# Patient Record
Sex: FEMALE | Race: WHITE | Employment: OTHER
[De-identification: names, ages, dates, MRNs, and addresses within clinical notes are randomized per-mention and may not be internally consistent; named-entity substitution may affect disease eponyms.]

---

## 2017-06-09 ENCOUNTER — HOSPITAL ENCOUNTER (OUTPATIENT)
Dept: MAMMOGRAPHY | Facility: CLINIC | Age: 62
Discharge: HOME | End: 2017-06-09
Payer: COMMERCIAL

## 2017-06-09 DIAGNOSIS — M81.0 OSTEOPOROSIS, UNSPECIFIED: ICD-10-CM

## 2017-06-09 PROCEDURE — 77080 DXA BONE DENSITY AXIAL: CPT

## 2022-02-27 ENCOUNTER — APPOINTMENT (OUTPATIENT)
Dept: GENERAL RADIOLOGY | Age: 67
End: 2022-02-27
Payer: COMMERCIAL

## 2022-02-27 ENCOUNTER — HOSPITAL ENCOUNTER (EMERGENCY)
Age: 67
Discharge: HOME | End: 2022-02-27
Attending: EMERGENCY MEDICINE
Payer: COMMERCIAL

## 2022-02-27 VITALS
DIASTOLIC BLOOD PRESSURE: 80 MMHG | HEIGHT: 67 IN | BODY MASS INDEX: 20.4 KG/M2 | TEMPERATURE: 98.4 F | RESPIRATION RATE: 20 BRPM | HEART RATE: 89 BPM | OXYGEN SATURATION: 96 % | WEIGHT: 130 LBS | SYSTOLIC BLOOD PRESSURE: 150 MMHG

## 2022-02-27 DIAGNOSIS — M79.671 RIGHT FOOT PAIN: Primary | ICD-10-CM

## 2022-02-27 LAB — GLUCOSE BLD-MCNC: 46 MG/DL (ref 65–105)

## 2022-02-27 PROCEDURE — 73630 X-RAY EXAM OF FOOT: CPT

## 2022-02-27 PROCEDURE — 99285 EMERGENCY DEPT VISIT HI MDM: CPT

## 2022-02-27 PROCEDURE — 73610 X-RAY EXAM OF ANKLE: CPT

## 2022-02-27 PROCEDURE — 82947 ASSAY GLUCOSE BLOOD QUANT: CPT

## 2022-02-27 RX ORDER — FOLIC ACID 1 MG/1
1 TABLET ORAL DAILY
COMMUNITY

## 2022-02-27 RX ORDER — OMEPRAZOLE 20 MG/1
CAPSULE, DELAYED RELEASE ORAL
COMMUNITY

## 2022-02-27 RX ORDER — NAPROXEN 500 MG/1
500 TABLET ORAL 2 TIMES DAILY WITH MEALS
Qty: 10 TABLET | Refills: 0 | Status: SHIPPED | OUTPATIENT
Start: 2022-02-27 | End: 2022-03-04

## 2022-02-27 RX ORDER — CHOLECALCIFEROL (VITAMIN D3) 125 MCG
CAPSULE ORAL
COMMUNITY
Start: 2021-12-17

## 2022-02-27 RX ORDER — INSULIN GLARGINE 100 [IU]/ML
INJECTION, SOLUTION SUBCUTANEOUS
COMMUNITY

## 2022-02-27 RX ORDER — AMOXICILLIN AND CLAVULANATE POTASSIUM 250; 125 MG/1; MG/1
TABLET, FILM COATED ORAL
COMMUNITY
Start: 2022-02-22

## 2022-02-27 RX ORDER — LISINOPRIL 2.5 MG/1
TABLET ORAL
COMMUNITY
Start: 2022-01-18

## 2022-02-27 RX ORDER — TRAMADOL HYDROCHLORIDE 50 MG/1
TABLET ORAL
COMMUNITY
Start: 2022-02-11

## 2022-02-27 RX ORDER — PREGABALIN 100 MG/1
CAPSULE ORAL
COMMUNITY

## 2022-02-27 RX ORDER — ATORVASTATIN CALCIUM 40 MG/1
TABLET, FILM COATED ORAL
COMMUNITY

## 2022-02-27 RX ORDER — INSULIN LISPRO 100 [IU]/ML
INJECTION, SOLUTION INTRAVENOUS; SUBCUTANEOUS
COMMUNITY

## 2022-02-27 RX ORDER — IBUPROFEN 200 MG
400 CAPSULE ORAL 3 TIMES DAILY
COMMUNITY
Start: 2021-12-27

## 2022-02-27 RX ORDER — HYDROXYZINE HYDROCHLORIDE 25 MG/1
25 TABLET, FILM COATED ORAL 3 TIMES DAILY PRN
COMMUNITY

## 2022-02-27 ASSESSMENT — ENCOUNTER SYMPTOMS: COLOR CHANGE: 0

## 2022-02-27 ASSESSMENT — PAIN - FUNCTIONAL ASSESSMENT: PAIN_FUNCTIONAL_ASSESSMENT: 0-10

## 2022-02-27 ASSESSMENT — PAIN SCALES - GENERAL: PAINLEVEL_OUTOF10: 7

## 2022-02-27 NOTE — ED PROVIDER NOTES
[facility]  EMERGENCY DEPARTMENT ENCOUNTER      Pt Name: Ronald Wheeler  MRN: [id number]  [place] 1955  Date of evaluation: 2/27/2022  Provider: Penny Dey       Chief Complaint   Patient presents with    Foot Injury         HISTORY OFPRESENT ILLNESS  (Location/Symptom, Timing/Onset, Context/Setting, Quality, Duration, Modifying Factors, Severity.)   Ronald Wheeler is a 77 y.o. female who presents to the emergency department by private auto for evaluation of right foot and ankle pain when she woke up this morning. Denies recent injury. Patient states she had a previous fracture in her right foot. Does see a podiatrist.  Has a walking boot. On tramadol at home for pain. Pain is a 6 out of 10 describes a throbbing sensation. Aggravated when she walks. Nursing Notes were reviewed. PASTMEDICAL HISTORY   No past medical history on file. SURGICAL HISTORY     No past surgical history on file.       CURRENT MEDICATIONS     Previous Medications    AMOXICILLIN-CLAVULANATE (AUGMENTIN) 250-125 MG PER TABLET    take 1 tablet by mouth once daily    ATORVASTATIN (LIPITOR) 40 MG TABLET    atorvastatin 40 mg tablet    CALCIUM CITRATE (CALCITRATE) 950 (200 CA) MG TABLET    Take 400 mg by mouth 3 times daily    CHOLECALCIFEROL (VITAMIN D3) 50 MCG (2000 UT) TABS    take 2 tablets by mouth once daily    FOLIC ACID (FOLVITE) 1 MG TABLET    Take 1 mg by mouth daily    HYDROXYZINE (ATARAX) 25 MG TABLET    Take 25 mg by mouth 3 times daily as needed    INSULIN GLARGINE (LANTUS SOLOSTAR) 100 UNIT/ML INJECTION PEN    Lantus Solostar U-100 Insulin 100 unit/mL (3 mL) subcutaneous pen    INSULIN LISPRO, 1 UNIT DIAL, (HUMALOG KWIKPEN) 100 UNIT/ML SOPN    Humalog KwikPen (U-100) Insulin 100 unit/mL subcutaneous    LISINOPRIL (PRINIVIL;ZESTRIL) 2.5 MG TABLET    take 1 tablet by mouth once daily    OMEPRAZOLE (PRILOSEC) 20 MG DELAYED RELEASE CAPSULE    omeprazole 20 mg capsule,delayed release    PREGABALIN (LYRICA) 100 MG CAPSULE    pregabalin 100 mg capsule    TRAMADOL (ULTRAM) 50 MG TABLET    take 1 tablet by mouth every 8 hours if needed    VERAPAMIL (CALAN SR) 120 MG EXTENDED RELEASE TABLET    verapamil ER (SR) 120 mg tablet,extended release       ALLERGIES     Penicillins    FAMILY HISTORY     No family history on file. SOCIAL HISTORY       Social History     Socioeconomic History    Marital status:      Spouse name: Not on file    Number of children: Not on file    Years of education: Not on file    Highest education level: Not on file   Occupational History    Not on file   Tobacco Use    Smoking status: Not on file    Smokeless tobacco: Not on file   Substance and Sexual Activity    Alcohol use: Not on file    Drug use: Not on file    Sexual activity: Not on file   Other Topics Concern    Not on file   Social History Narrative    Not on file     Social Determinants of Health     Financial Resource Strain:     Difficulty of Paying Living Expenses: Not on file   Food Insecurity:     Worried About Running Out of Food in the Last Year: Not on file    Scotty of Food in the Last Year: Not on file   Transportation Needs:     Lack of Transportation (Medical): Not on file    Lack of Transportation (Non-Medical):  Not on file   Physical Activity:     Days of Exercise per Week: Not on file    Minutes of Exercise per Session: Not on file   Stress:     Feeling of Stress : Not on file   Social Connections:     Frequency of Communication with Friends and Family: Not on file    Frequency of Social Gatherings with Friends and Family: Not on file    Attends Mandaen Services: Not on file    Active Member of Clubs or Organizations: Not on file    Attends Club or Organization Meetings: Not on file    Marital Status: Not on file   Intimate Partner Violence:     Fear of Current or Ex-Partner: Not on file    Emotionally Abused: Not on file    Physically Abused: Not on file    Sexually Abused: Not on file   Housing Stability:     Unable to Pay for Housing in the Last Year: Not on file    Number of Places Lived in the Last Year: Not on file    Unstable Housing in the Last Year: Not on file         REVIEW OF SYSTEMS    (2-9 systems for level 4, 10 or more for level 5)     Review of Systems   Musculoskeletal: Positive for arthralgias, gait problem and joint swelling. Skin: Negative for color change and wound. All other systems reviewed and are negative. Except as noted above the remainder of the review of systems was reviewed and negative. PHYSICAL EXAM    (up to 7 for level 4, 8 or more for level 5)     ED Triage Vitals [02/27/22 1806]   BP Temp Temp src Pulse Resp SpO2 Height Weight   (!) 150/80 98.4 °F (36.9 °C) -- 89 20 96 % 5' 6.5\" (1.689 m) 130 lb (59 kg)       Physical Exam  Constitutional:       Appearance: Normal appearance. She is well-developed, well-groomed and normal weight. HENT:      Head: Normocephalic. Right Ear: External ear normal.      Left Ear: External ear normal.      Nose: Nose normal.      Mouth/Throat:      Mouth: Mucous membranes are moist.   Eyes:      Extraocular Movements: Extraocular movements intact. Conjunctiva/sclera: Conjunctivae normal.      Pupils: Pupils are equal, round, and reactive to light. Cardiovascular:      Pulses:           Dorsalis pedis pulses are 2+ on the right side. Posterior tibial pulses are 2+ on the right side. Pulmonary:      Effort: Pulmonary effort is normal. No respiratory distress. Musculoskeletal:         General: Normal range of motion. Right ankle: Swelling present. No deformity or ecchymosis. Tenderness present over the lateral malleolus. Normal range of motion. Right foot: Normal range of motion and normal capillary refill. Swelling and bony tenderness present. No crepitus. Normal pulse.         Feet:       Comments: Patient is some tenderness to the dorsum of the foot over the first second toes as well as the medial malleolus. Mild swelling present. No erythema or ecchymosis. Pedal pulses palpable. Can wiggle her toes. Normal Flexion of the right ankle. Skin:     General: Skin is warm and dry. Capillary Refill: Capillary refill takes less than 2 seconds. Findings: No bruising or erythema. Neurological:      Mental Status: She is alert and oriented to person, place, and time. Psychiatric:         Mood and Affect: Mood normal.         Behavior: Behavior normal.         Thought Content: Thought content normal.         Judgment: Judgment normal.           DIAGNOSTIC RESULTS     EKG:All EKG's are interpreted by the Emergency Department Physician who either signs or Co-signs this chart in the absence of a cardiologist.        RADIOLOGY:   Non-plain film images such as CT, Ultrasound and MRI are read by theradiologist. Plain radiographic images are visualized and preliminarily interpreted by the emergency physician with the below findings:    XR ANKLE RIGHT (MIN 3 VIEWS)    Result Date: 2/27/2022  EXAMINATION: THREE XRAY VIEWS OF THE RIGHT ANKLE; THREE XRAY VIEWS OF THE RIGHT FOOT 2/27/2022 4:06 pm COMPARISON: None. HISTORY: ORDERING SYSTEM PROVIDED HISTORY: pain TECHNOLOGIST PROVIDED HISTORY: pain Reason for Exam: Rt ankle pain d/t preexisting Rt foot fx; ORDERING SYSTEM PROVIDED HISTORY: increased pain d/t preexisting fx. TECHNOLOGIST PROVIDED HISTORY: increased pain d/t preexisting fx. Reason for Exam: Rt foot increased pain d/t preexisting fx FINDINGS: The visualized bones are normal.  There is no evidence of fracture or dislocation. Severe degenerative changes of the 1st metatarsophalangeal joint and also degenerative changes involving the navicular 1st cuneiform joint. . The remaining joint spaces appear well maintained. Soft tissue swelling.      No acute bony abnormalities are noted     XR FOOT RIGHT (MIN 3 VIEWS)    Result Date: 2/27/2022  EXAMINATION: THREE XRAY VIEWS OF THE RIGHT ANKLE; THREE XRAY VIEWS OF THE RIGHT FOOT 2/27/2022 4:06 pm COMPARISON: None. HISTORY: ORDERING SYSTEM PROVIDED HISTORY: pain TECHNOLOGIST PROVIDED HISTORY: pain Reason for Exam: Rt ankle pain d/t preexisting Rt foot fx; ORDERING SYSTEM PROVIDED HISTORY: increased pain d/t preexisting fx. TECHNOLOGIST PROVIDED HISTORY: increased pain d/t preexisting fx. Reason for Exam: Rt foot increased pain d/t preexisting fx FINDINGS: The visualized bones are normal.  There is no evidence of fracture or dislocation. Severe degenerative changes of the 1st metatarsophalangeal joint and also degenerative changes involving the navicular 1st cuneiform joint. . The remaining joint spaces appear well maintained. Soft tissue swelling. No acute bony abnormalities are noted     Interpretation per the Radiologist below, if available at the time of this note:    XR ANKLE RIGHT (MIN 3 VIEWS)   Final Result   No acute bony abnormalities are noted         XR FOOT RIGHT (MIN 3 VIEWS)   Final Result   No acute bony abnormalities are noted               EDBEDSIDE ULTRASOUND:   Performed by Kingston Starr - none    LABS:  Labs Reviewed - No data to display    All other labs were within normal range or not returned as of this dictation. EMERGENCY DEPARTMENT COURSE andDIFFERENTIAL DIAGNOSIS/MDM:   X-rays of right ankle and right foot per radiologist shows no acute bony abnormalities. Degenerative changes of the first metatarsophalangeal joint. I discussed x-ray results with the patient. She has an orthopedic boot upon arrival but wants something different. Ace wrap and Aircast placed her ankle by the nurse. Prescription written for naproxen. She is already on tramadol at home for pain. Patient states she has a podiatrist and will follow up with her doctor.     Vitals:    Vitals:    02/27/22 1806   BP: (!) 150/80   Pulse: 89   Resp: 20   Temp: 98.4 °F (36.9 °C)   SpO2: 96%   Weight: 130 lb (59 kg)   Height: 5' 6.5\" (1.689 m)         CONSULTS:  None    RES:  Procedures    FINAL IMPRESSION      1.  Right foot pain          DISPOSITION/PLAN   DISPOSITION Decision To Discharge 02/27/2022 07:35:15 PM      PATIENT REFERRED TO:     Follow-up with your podiatrist.  Schedule an appointment as soon as possible for a visit       [id number] [place] [address] [address] [facility], [address] [place] [address]  [phone]      As needed    [facility]  [address] [facility]  [phone]    As needed      DISCHARGE MEDICATIONS:     New Prescriptions    NAPROXEN (NAPROSYN) 500 MG TABLET    Take 1 tablet by mouth 2 times daily (with meals) for 5 days     Electronically signed by KASHMIR Mancuso 2/27/2022 at 7:37 PM           KASHMIR Mancuso CNP  02/27/22 1938

## 2022-02-28 NOTE — ED NOTES
POCT Glucose checked per pt's request; pt had insulin PTA but did not eat dinner. Pt was provided with OJ & boxed lunch along with pt's cab voucher. Pt verbalized understanding. Pt was wheeled to the lobby.       Esther Queen RN  02/27/22 2000

## 2022-03-03 NOTE — ED PROVIDER NOTES
eMERGENCY dEPARTMENT eNCOUnter   Independent Attestation     Pt Name: Rebeca Ramirez  MRN: [id number]  [place] 1955  Date of evaluation: 3/3/22     Rebeca Ramirez is a 77 y.o. female with CC: Foot Injury        This visit was performed by both a physician and an APC. I performed all aspects of the MDM as documented. The care is provided during an unprecedented national emergency due to the novel coronavirus, COVID 19.     Adriane Clemens MD  Attending Emergency Physician          Brayan Freeman MD  03/03/22 1168

## 2023-04-28 ENCOUNTER — APPOINTMENT (OUTPATIENT)
Dept: GENERAL RADIOLOGY | Age: 68
End: 2023-04-28
Payer: COMMERCIAL

## 2023-04-28 ENCOUNTER — HOSPITAL ENCOUNTER (EMERGENCY)
Age: 68
Discharge: HOME | End: 2023-04-28
Attending: EMERGENCY MEDICINE
Payer: COMMERCIAL

## 2023-04-28 VITALS
TEMPERATURE: 98.6 F | SYSTOLIC BLOOD PRESSURE: 131 MMHG | RESPIRATION RATE: 16 BRPM | HEIGHT: 66 IN | BODY MASS INDEX: 20.89 KG/M2 | HEART RATE: 91 BPM | OXYGEN SATURATION: 96 % | WEIGHT: 130 LBS | DIASTOLIC BLOOD PRESSURE: 72 MMHG

## 2023-04-28 DIAGNOSIS — U07.1 COVID-19: Primary | ICD-10-CM

## 2023-04-28 LAB
CHP ED QC CHECK: YES
GLUCOSE BLD-MCNC: 246 MG/DL
GLUCOSE BLD-MCNC: 246 MG/DL (ref 65–105)

## 2023-04-28 PROCEDURE — 82947 ASSAY GLUCOSE BLOOD QUANT: CPT

## 2023-04-28 PROCEDURE — 71045 X-RAY EXAM CHEST 1 VIEW: CPT

## 2023-04-28 PROCEDURE — 99283 EMERGENCY DEPT VISIT LOW MDM: CPT

## 2023-04-28 RX ORDER — METHYLPREDNISOLONE 4 MG/1
TABLET ORAL
Qty: 1 KIT | Refills: 0 | Status: SHIPPED | OUTPATIENT
Start: 2023-04-28 | End: 2023-05-04

## 2023-04-28 ASSESSMENT — PAIN - FUNCTIONAL ASSESSMENT: PAIN_FUNCTIONAL_ASSESSMENT: NONE - DENIES PAIN

## 2023-04-28 NOTE — ED NOTES
Patient presents to ED for a positive Covid test at home this morning. Patient reports had a positive Covid test at home this morning by a [employer] [facility] staff member at [facility] where she lives. Patient only symptoms/complaint is loss of taste and smell. Patient is in no acute distress, respirations at regular and unlabored, SpO2 WNL, no adventitious breath sounds noted.       Ernie Rodriguez., [place]  01/38/50 3753

## 2023-04-29 NOTE — ED PROVIDER NOTES
EMERGENCY DEPARTMENT ENCOUNTER    Pt Name: Cheri Gibson  MRN: [id number]  [place] 1955  Date of evaluation: 4/28/23  CHIEF COMPLAINT       Chief Complaint   Patient presents with    Positive For Covid-19     Patient reports had a positive Covid test at home this morning by a [employer] [facility] staff member at [facility] where she lives. HISTORY OF PRESENT ILLNESS   71-year-old female presents the emergency room after testing positive for COVID-19 earlier today. Patient has had some nasal congestion and head cold symptoms. She has a slight cough. No chest discomfort or difficulty breathing. Patient does not have a fever here in the emergency room today. She reports overall she does not feel poor. REVIEW OF SYSTEMS     Review of Systems   HENT:  Positive for congestion. Respiratory:  Negative for chest tightness and shortness of breath. Neurological:  Positive for headaches. PASTMEDICAL HISTORY   No past medical history on file. Past Problem List  There is no problem list on file for this patient. SURGICAL HISTORY     No past surgical history on file.   CURRENT MEDICATIONS       Discharge Medication List as of 4/28/2023  9:16 PM        CONTINUE these medications which have NOT CHANGED    Details   amoxicillin-clavulanate (AUGMENTIN) 250-125 MG per tablet take 1 tablet by mouth once dailyHistorical Med      atorvastatin (LIPITOR) 40 MG tablet atorvastatin 40 mg tabletHistorical Med      calcium citrate (CALCITRATE) 950 (200 Ca) MG tablet Take 400 mg by mouth 3 times dailyHistorical Med      Cholecalciferol (VITAMIN D3) 50 MCG (2000 UT) TABS take 2 tablets by mouth once dailyHistorical Med      folic acid (FOLVITE) 1 MG tablet Take 1 mg by mouth dailyHistorical Med      hydrOXYzine (ATARAX) 25 MG tablet Take 25 mg by mouth 3 times daily as neededHistorical Med      insulin glargine (LANTUS SOLOSTAR) 100 UNIT/ML injection pen Lantus Solostar U-100 Insulin 100 unit/mL

## 2023-04-29 NOTE — DISCHARGE INSTRUCTIONS
Start the Paxlovid when possible. Steroids can be used as well to help with respiratory symptoms. I recommend return to the ER especially if symptoms worsen.

## 2023-04-30 ASSESSMENT — ENCOUNTER SYMPTOMS
SHORTNESS OF BREATH: 0
CHEST TIGHTNESS: 0

## 2024-02-05 ENCOUNTER — HOSPITAL ENCOUNTER (EMERGENCY)
Age: 69
Discharge: HOME | End: 2024-02-05
Attending: EMERGENCY MEDICINE
Payer: COMMERCIAL

## 2024-02-05 ENCOUNTER — APPOINTMENT (OUTPATIENT)
Dept: VASCULAR LAB | Age: 69
End: 2024-02-05
Attending: EMERGENCY MEDICINE
Payer: COMMERCIAL

## 2024-02-05 VITALS
BODY MASS INDEX: 21.86 KG/M2 | SYSTOLIC BLOOD PRESSURE: 136 MMHG | HEIGHT: 66 IN | RESPIRATION RATE: 16 BRPM | HEART RATE: 72 BPM | WEIGHT: 136 LBS | DIASTOLIC BLOOD PRESSURE: 58 MMHG | OXYGEN SATURATION: 95 % | TEMPERATURE: 98.6 F

## 2024-02-05 DIAGNOSIS — I82.4Z2 ACUTE DEEP VEIN THROMBOSIS (DVT) OF DISTAL VEIN OF LEFT LOWER EXTREMITY (HCC): ICD-10-CM

## 2024-02-05 DIAGNOSIS — M79.605 ACUTE PAIN OF LEFT LOWER EXTREMITY: Primary | ICD-10-CM

## 2024-02-05 LAB — ECHO BSA: 1.69 M2

## 2024-02-05 PROCEDURE — 93971 EXTREMITY STUDY: CPT

## 2024-02-05 PROCEDURE — 99284 EMERGENCY DEPT VISIT MOD MDM: CPT

## 2024-02-05 PROCEDURE — 6370000000 HC RX 637 (ALT 250 FOR IP): Performed by: EMERGENCY MEDICINE

## 2024-02-05 RX ORDER — OXYCODONE HYDROCHLORIDE AND ACETAMINOPHEN 5; 325 MG/1; MG/1
1 TABLET ORAL ONCE
Status: COMPLETED | OUTPATIENT
Start: 2024-02-05 | End: 2024-02-05

## 2024-02-05 RX ORDER — OXYCODONE HYDROCHLORIDE AND ACETAMINOPHEN 5; 325 MG/1; MG/1
1 TABLET ORAL EVERY 8 HOURS PRN
Qty: 14 TABLET | Refills: 0 | Status: SHIPPED | OUTPATIENT
Start: 2024-02-05 | End: 2024-02-12

## 2024-02-05 RX ADMIN — OXYCODONE HYDROCHLORIDE AND ACETAMINOPHEN 1 TABLET: 5; 325 TABLET ORAL at 08:25

## 2024-02-05 ASSESSMENT — PAIN DESCRIPTION - ORIENTATION
ORIENTATION: LEFT;LOWER
ORIENTATION: LEFT;LOWER

## 2024-02-05 ASSESSMENT — PAIN SCALES - GENERAL
PAINLEVEL_OUTOF10: 5
PAINLEVEL_OUTOF10: 7

## 2024-02-05 ASSESSMENT — PAIN DESCRIPTION - LOCATION
LOCATION: LEG
LOCATION: LEG

## 2024-02-05 ASSESSMENT — PAIN DESCRIPTION - DESCRIPTORS: DESCRIPTORS: SHARP

## 2024-02-05 ASSESSMENT — PAIN - FUNCTIONAL ASSESSMENT: PAIN_FUNCTIONAL_ASSESSMENT: 0-10

## 2024-02-05 ASSESSMENT — PAIN DESCRIPTION - FREQUENCY: FREQUENCY: INTERMITTENT

## 2024-02-05 NOTE — DISCHARGE INSTRUCTIONS
Continue with the Eliquis as prescribed previously.  Given increase in pain I would recommend Percocet.  It is not recommended to take Percocet and tramadol together.  These medications are both narcotics.    Make sure to follow-up with vascular surgery as previously instructed.

## 2024-02-05 NOTE — ED PROVIDER NOTES
EMERGENCY DEPARTMENT ENCOUNTER    Pt Name: Camille Garduno  MRN: [id number]  Birthdate 1955  Date of evaluation: 2/5/24  CHIEF COMPLAINT       Chief Complaint   Patient presents with    Leg Pain     L lower leg pain     HISTORY OF PRESENT ILLNESS   68 old female presents emergency room for increased pain to her left lower leg.  Patient had been diagnosed with a DVT on Friday.  She was started on Eliquis.  She reports increased pain and feeling like the clot is traveling.  She is here demanding a second ultrasound although it has only been 3 days since the ultrasound done on Friday.  She is not having any shortness of breath or chest pain.  She is very concerned about the \" clot traveling.\"             REVIEW OF SYSTEMS     Review of Systems   Cardiovascular:  Positive for leg swelling.     PASTMEDICAL HISTORY   No past medical history on file.  Past Problem List  There is no problem list on file for this patient.    SURGICAL HISTORY     No past surgical history on file.  CURRENT MEDICATIONS       Previous Medications    AMOXICILLIN-CLAVULANATE (AUGMENTIN) 250-125 MG PER TABLET    take 1 tablet by mouth once daily    ATORVASTATIN (LIPITOR) 40 MG TABLET    atorvastatin 40 mg tablet    CALCIUM CITRATE (CALCITRATE) 950 (200 CA) MG TABLET    Take 400 mg by mouth 3 times daily    CHOLECALCIFEROL (VITAMIN D3) 50 MCG (2000 UT) TABS    take 2 tablets by mouth once daily    FOLIC ACID (FOLVITE) 1 MG TABLET    Take 1 mg by mouth daily    HYDROXYZINE (ATARAX) 25 MG TABLET    Take 25 mg by mouth 3 times daily as needed    INSULIN GLARGINE (LANTUS SOLOSTAR) 100 UNIT/ML INJECTION PEN    Lantus Solostar U-100 Insulin 100 unit/mL (3 mL) subcutaneous pen    INSULIN LISPRO, 1 UNIT DIAL, (HUMALOG KWIKPEN) 100 UNIT/ML SOPN    Humalog KwikPen (U-100) Insulin 100 unit/mL subcutaneous    LISINOPRIL (PRINIVIL;ZESTRIL) 2.5 MG TABLET    take 1 tablet by mouth once daily    NAPROXEN (NAPROSYN) 500 MG TABLET    Take 1 tablet by mouth 2 times

## 2024-02-05 NOTE — ED TRIAGE NOTES
Pt to ED via [facility], stretcher to rm 23, bed per self. Pt c/o L lower leg pain since Fri. Seen Fri, given dx of dvt, partially compliant with Elaquis. States intermittent pain and traveling location of pain. Small bruise noted. Pt denies falls, trauma to leg, sob or cp. +rom to L leg. Patent airway, no dyspnea or cyanosis noted. Bed to lowest position, side rails up x2, call light within reach.

## 2024-08-15 ENCOUNTER — HOSPITAL ENCOUNTER (EMERGENCY)
Age: 69
Discharge: HOME | End: 2024-08-15
Attending: EMERGENCY MEDICINE
Payer: COMMERCIAL

## 2024-08-15 VITALS
DIASTOLIC BLOOD PRESSURE: 68 MMHG | RESPIRATION RATE: 16 BRPM | OXYGEN SATURATION: 98 % | TEMPERATURE: 97.8 F | HEART RATE: 78 BPM | BODY MASS INDEX: 21.95 KG/M2 | WEIGHT: 136 LBS | SYSTOLIC BLOOD PRESSURE: 158 MMHG

## 2024-08-15 DIAGNOSIS — R11.2 NAUSEA AND VOMITING, UNSPECIFIED VOMITING TYPE: Primary | ICD-10-CM

## 2024-08-15 LAB
ALBUMIN SERPL-MCNC: 3.5 G/DL (ref 3.5–5.2)
ALP SERPL-CCNC: 63 U/L (ref 35–104)
ALT SERPL-CCNC: 16 U/L (ref 5–33)
ANION GAP SERPL CALCULATED.3IONS-SCNC: 10 MMOL/L (ref 9–17)
AST SERPL-CCNC: 15 U/L
BASOPHILS # BLD: 0.06 K/UL (ref 0–0.2)
BASOPHILS NFR BLD: 1 %
BILIRUB DIRECT SERPL-MCNC: 0.2 MG/DL
BILIRUB INDIRECT SERPL-MCNC: 0.4 MG/DL (ref 0–1)
BILIRUB SERPL-MCNC: 0.6 MG/DL (ref 0.3–1.2)
BUN SERPL-MCNC: 17 MG/DL (ref 8–23)
BUN/CREAT SERPL: 34 (ref 9–20)
CALCIUM SERPL-MCNC: 8.7 MG/DL (ref 8.6–10.4)
CHLORIDE SERPL-SCNC: 104 MMOL/L (ref 98–107)
CO2 SERPL-SCNC: 22 MMOL/L (ref 20–31)
CREAT SERPL-MCNC: 0.5 MG/DL (ref 0.5–0.9)
EOSINOPHIL # BLD: 0.06 K/UL (ref 0–0.4)
EOSINOPHILS RELATIVE PERCENT: 1 % (ref 1–4)
ERYTHROCYTE [DISTWIDTH] IN BLOOD BY AUTOMATED COUNT: 13.5 % (ref 11.8–14.4)
FLUAV RNA RESP QL NAA+PROBE: NOT DETECTED
FLUBV RNA RESP QL NAA+PROBE: NOT DETECTED
GFR, ESTIMATED: >90 ML/MIN/1.73M2
GLUCOSE SERPL-MCNC: 222 MG/DL (ref 70–99)
HCT VFR BLD AUTO: 37.7 % (ref 36.3–47.1)
HGB BLD-MCNC: 12.5 G/DL (ref 11.9–15.1)
IMM GRANULOCYTES # BLD AUTO: 0 K/UL (ref 0–0.3)
IMM GRANULOCYTES NFR BLD: 0 %
INR PPP: 1
LIPASE SERPL-CCNC: 7 U/L (ref 13–60)
LYMPHOCYTES NFR BLD: 0.61 K/UL (ref 1–4.8)
LYMPHOCYTES RELATIVE PERCENT: 11 % (ref 24–44)
MCH RBC QN AUTO: 30.1 PG (ref 25.2–33.5)
MCHC RBC AUTO-ENTMCNC: 33.2 G/DL (ref 28.4–34.8)
MCV RBC AUTO: 90.8 FL (ref 82.6–102.9)
MONOCYTES NFR BLD: 0.39 K/UL (ref 0.2–0.8)
MONOCYTES NFR BLD: 7 % (ref 1–7)
NEUTROPHILS NFR BLD: 80 % (ref 36–66)
NEUTS SEG NFR BLD: 4.38 K/UL (ref 1.8–7.7)
NRBC BLD-RTO: 0 PER 100 WBC
PLATELET # BLD AUTO: 185 K/UL (ref 138–453)
PMV BLD AUTO: 11.2 FL (ref 8.1–13.5)
POTASSIUM SERPL-SCNC: 4.3 MMOL/L (ref 3.7–5.3)
PROT SERPL-MCNC: 6 G/DL (ref 6.4–8.3)
PROTHROMBIN TIME: 13.6 SEC (ref 11.5–14.2)
RBC # BLD AUTO: 4.15 M/UL (ref 3.95–5.11)
SARS-COV-2 RNA RESP QL NAA+PROBE: NOT DETECTED
SODIUM SERPL-SCNC: 136 MMOL/L (ref 135–144)
SOURCE: NORMAL
SPECIMEN DESCRIPTION: NORMAL
WBC OTHER # BLD: 5.5 K/UL (ref 3.5–11.3)

## 2024-08-15 PROCEDURE — 2580000003 HC RX 258: Performed by: EMERGENCY MEDICINE

## 2024-08-15 PROCEDURE — 99284 EMERGENCY DEPT VISIT MOD MDM: CPT

## 2024-08-15 PROCEDURE — 83690 ASSAY OF LIPASE: CPT

## 2024-08-15 PROCEDURE — 6360000002 HC RX W HCPCS: Performed by: EMERGENCY MEDICINE

## 2024-08-15 PROCEDURE — 85610 PROTHROMBIN TIME: CPT

## 2024-08-15 PROCEDURE — 80048 BASIC METABOLIC PNL TOTAL CA: CPT

## 2024-08-15 PROCEDURE — 85025 COMPLETE CBC W/AUTO DIFF WBC: CPT

## 2024-08-15 PROCEDURE — 80076 HEPATIC FUNCTION PANEL: CPT

## 2024-08-15 PROCEDURE — 87636 SARSCOV2 & INF A&B AMP PRB: CPT

## 2024-08-15 PROCEDURE — 96374 THER/PROPH/DIAG INJ IV PUSH: CPT

## 2024-08-15 RX ORDER — ONDANSETRON 2 MG/ML
4 INJECTION INTRAMUSCULAR; INTRAVENOUS ONCE
Status: COMPLETED | OUTPATIENT
Start: 2024-08-15 | End: 2024-08-15

## 2024-08-15 RX ORDER — 0.9 % SODIUM CHLORIDE 0.9 %
1000 INTRAVENOUS SOLUTION INTRAVENOUS ONCE
Status: COMPLETED | OUTPATIENT
Start: 2024-08-15 | End: 2024-08-15

## 2024-08-15 RX ORDER — ONDANSETRON 4 MG/1
4 TABLET, ORALLY DISINTEGRATING ORAL 3 TIMES DAILY PRN
Qty: 21 TABLET | Refills: 0 | Status: SHIPPED | OUTPATIENT
Start: 2024-08-15

## 2024-08-15 RX ADMIN — SODIUM CHLORIDE 1000 ML: 9 INJECTION, SOLUTION INTRAVENOUS at 13:12

## 2024-08-15 RX ADMIN — ONDANSETRON 4 MG: 2 INJECTION INTRAMUSCULAR; INTRAVENOUS at 13:13

## 2024-08-15 ASSESSMENT — ENCOUNTER SYMPTOMS
NAUSEA: 1
COLOR CHANGE: 0
SHORTNESS OF BREATH: 0
TROUBLE SWALLOWING: 0
COUGH: 1
FACIAL SWELLING: 0
BACK PAIN: 0
VOICE CHANGE: 0
PHOTOPHOBIA: 0
VOMITING: 1
CHEST TIGHTNESS: 0
EYE PAIN: 0
ABDOMINAL PAIN: 0

## 2024-08-15 ASSESSMENT — PAIN - FUNCTIONAL ASSESSMENT: PAIN_FUNCTIONAL_ASSESSMENT: NONE - DENIES PAIN

## 2024-08-15 NOTE — ED PROVIDER NOTES
EMERGENCY DEPARTMENT ENCOUNTER    Pt Name: Camille Garduno  MRN: [id number]  Birthdate 1955  Date of evaluation: 8/15/24  CHIEF COMPLAINT       Chief Complaint   Patient presents with    Emesis     Started yesterday where she is living is on lock down due to covid     HISTORY OF PRESENT ILLNESS   68-year-old female presenting to the ER complaining of nausea vomiting and a cough since yesterday.  Patient states her facility is on lockdown because of a COVID outbreak.  Patient was concerned because of the amount of vomiting.    The history is provided by the patient.   Nausea & Vomiting  Severity:  Moderate  Duration:  1 day  Timing:  Constant  Associated symptoms: cough    Associated symptoms: no abdominal pain, no arthralgias and no headaches            REVIEW OF SYSTEMS     Review of Systems   Constitutional:  Negative for activity change, appetite change and fatigue.   HENT:  Negative for facial swelling, trouble swallowing and voice change.    Eyes:  Negative for photophobia and pain.   Respiratory:  Positive for cough. Negative for chest tightness and shortness of breath.    Cardiovascular:  Negative for chest pain and palpitations.   Gastrointestinal:  Positive for nausea and vomiting. Negative for abdominal pain.   Genitourinary:  Negative for dysuria and urgency.   Musculoskeletal:  Negative for arthralgias and back pain.   Skin:  Negative for color change and rash.   Neurological:  Negative for dizziness, syncope and headaches.   Psychiatric/Behavioral:  Negative for behavioral problems and hallucinations.      PASTMEDICAL HISTORY   History reviewed. No pertinent past medical history.  Past Problem List  There is no problem list on file for this patient.    SURGICAL HISTORY     History reviewed. No pertinent surgical history.  CURRENT MEDICATIONS       Previous Medications    AMOXICILLIN-CLAVULANATE (AUGMENTIN) 250-125 MG PER TABLET    take 1 tablet by mouth once daily    ATORVASTATIN (LIPITOR) 40 MG

## 2024-08-15 NOTE — ED NOTES
Px states that she would like to be put in observation due to inability to  meds. Px educated/assisted to our pharmacy so she is able to  her medications

## 2024-12-15 ENCOUNTER — HOSPITAL ENCOUNTER (INPATIENT)
Age: 69
LOS: 2 days | Discharge: HOME HEALTH | DRG: 638 | End: 2024-12-17
Attending: EMERGENCY MEDICINE | Admitting: FAMILY MEDICINE
Payer: COMMERCIAL

## 2024-12-15 DIAGNOSIS — R73.9 HYPERGLYCEMIA: ICD-10-CM

## 2024-12-15 DIAGNOSIS — N17.9 ACUTE KIDNEY INJURY (HCC): Primary | ICD-10-CM

## 2024-12-15 PROBLEM — N39.41 URGE INCONTINENCE: Status: ACTIVE | Noted: 2023-03-29

## 2024-12-15 PROBLEM — M15.0 PRIMARY GENERALIZED (OSTEO)ARTHRITIS: Status: ACTIVE | Noted: 2021-02-24

## 2024-12-15 PROBLEM — R27.8 SENSORY ATAXIA: Status: ACTIVE | Noted: 2024-12-15

## 2024-12-15 PROBLEM — R33.9 RETENTION OF URINE: Status: ACTIVE | Noted: 2021-03-04

## 2024-12-15 PROBLEM — F42.9 OBSESSIVE-COMPULSIVE DISORDER: Status: ACTIVE | Noted: 2024-12-03

## 2024-12-15 PROBLEM — G62.9 NEUROPATHY: Status: ACTIVE | Noted: 2020-09-28

## 2024-12-15 PROBLEM — M72.2 PLANTAR FASCIITIS OF RIGHT FOOT: Status: ACTIVE | Noted: 2024-03-28

## 2024-12-15 PROBLEM — M81.0 AGE RELATED OSTEOPOROSIS: Status: ACTIVE | Noted: 2024-08-13

## 2024-12-15 PROBLEM — N39.0 RECURRENT UTI: Status: ACTIVE | Noted: 2019-06-04

## 2024-12-15 PROBLEM — E11.42 TYPE 2 DIABETES MELLITUS WITH POLYNEUROPATHY (HCC): Status: ACTIVE | Noted: 2018-11-03

## 2024-12-15 PROBLEM — E11.00 HYPEROSMOLAR HYPERGLYCEMIC STATE (HHS) (HCC): Status: ACTIVE | Noted: 2024-12-15

## 2024-12-15 PROBLEM — E78.2 MIXED HYPERLIPIDEMIA: Status: ACTIVE | Noted: 2020-09-28

## 2024-12-15 LAB
ANION GAP SERPL CALCULATED.3IONS-SCNC: 10 MMOL/L (ref 9–16)
BASOPHILS # BLD: 0.04 K/UL (ref 0–0.2)
BASOPHILS NFR BLD: 1 % (ref 0–2)
BILIRUB UR QL STRIP: NEGATIVE
BUN SERPL-MCNC: 45 MG/DL (ref 8–23)
CALCIUM SERPL-MCNC: 8.7 MG/DL (ref 8.8–10.2)
CHLORIDE SERPL-SCNC: 101 MMOL/L (ref 98–107)
CLARITY UR: ABNORMAL
CO2 SERPL-SCNC: 22 MMOL/L (ref 20–31)
COLOR UR: YELLOW
CREAT SERPL-MCNC: 1.8 MG/DL (ref 0.5–0.9)
EOSINOPHIL # BLD: 0.21 K/UL (ref 0–0.44)
EOSINOPHILS RELATIVE PERCENT: 4 % (ref 1–4)
EPI CELLS #/AREA URNS HPF: ABNORMAL /HPF (ref 0–5)
ERYTHROCYTE [DISTWIDTH] IN BLOOD BY AUTOMATED COUNT: 12.7 % (ref 11.8–14.4)
EST. AVERAGE GLUCOSE BLD GHB EST-MCNC: 220 MG/DL
GFR, ESTIMATED: 30 ML/MIN/1.73M2
GLUCOSE BLD-MCNC: 218 MG/DL (ref 65–105)
GLUCOSE BLD-MCNC: 338 MG/DL (ref 65–105)
GLUCOSE BLD-MCNC: 487 MG/DL (ref 65–105)
GLUCOSE BLD-MCNC: 68 MG/DL (ref 65–105)
GLUCOSE BLD-MCNC: 82 MG/DL (ref 65–105)
GLUCOSE SERPL-MCNC: 482 MG/DL (ref 82–115)
GLUCOSE UR STRIP-MCNC: ABNORMAL MG/DL
HBA1C MFR BLD: 9.3 % (ref 4–6)
HCT VFR BLD AUTO: 35.8 % (ref 36.3–47.1)
HGB BLD-MCNC: 12.2 G/DL (ref 11.9–15.1)
HGB UR QL STRIP.AUTO: ABNORMAL
IMM GRANULOCYTES # BLD AUTO: 0.03 K/UL (ref 0–0.3)
IMM GRANULOCYTES NFR BLD: 1 %
KETONES UR STRIP-MCNC: NEGATIVE MG/DL
LEUKOCYTE ESTERASE UR QL STRIP: ABNORMAL
LYMPHOCYTES NFR BLD: 1.24 K/UL (ref 1.1–3.7)
LYMPHOCYTES RELATIVE PERCENT: 23 % (ref 24–43)
MCH RBC QN AUTO: 30.1 PG (ref 25.2–33.5)
MCHC RBC AUTO-ENTMCNC: 34.1 G/DL (ref 28.4–34.8)
MCV RBC AUTO: 88.4 FL (ref 82.6–102.9)
MONOCYTES NFR BLD: 0.58 K/UL (ref 0.1–1.2)
MONOCYTES NFR BLD: 11 % (ref 3–12)
NEUTROPHILS NFR BLD: 60 % (ref 36–65)
NEUTS SEG NFR BLD: 3.34 K/UL (ref 1.5–8.1)
NITRITE UR QL STRIP: NEGATIVE
NRBC BLD-RTO: 0 PER 100 WBC
PH UR STRIP: 5.5 [PH] (ref 5–8)
PLATELET # BLD AUTO: 230 K/UL (ref 138–453)
PMV BLD AUTO: 10.4 FL (ref 8.1–13.5)
POTASSIUM SERPL-SCNC: 4.5 MMOL/L (ref 3.7–5.3)
PROT UR STRIP-MCNC: ABNORMAL MG/DL
RBC # BLD AUTO: 4.05 M/UL (ref 3.95–5.11)
RBC #/AREA URNS HPF: ABNORMAL /HPF (ref 0–2)
SODIUM SERPL-SCNC: 133 MMOL/L (ref 136–145)
SODIUM UR-SCNC: 42 MMOL/L
SP GR UR STRIP: 1.01 (ref 1–1.03)
TOTAL PROTEIN, URINE: 57 MG/DL
UROBILINOGEN UR STRIP-ACNC: NORMAL EU/DL (ref 0–1)
WBC #/AREA URNS HPF: ABNORMAL /HPF (ref 0–5)
WBC OTHER # BLD: 5.4 K/UL (ref 3.5–11.3)

## 2024-12-15 PROCEDURE — 99285 EMERGENCY DEPT VISIT HI MDM: CPT

## 2024-12-15 PROCEDURE — 85025 COMPLETE CBC W/AUTO DIFF WBC: CPT

## 2024-12-15 PROCEDURE — 83036 HEMOGLOBIN GLYCOSYLATED A1C: CPT

## 2024-12-15 PROCEDURE — 81001 URINALYSIS AUTO W/SCOPE: CPT

## 2024-12-15 PROCEDURE — 84156 ASSAY OF PROTEIN URINE: CPT

## 2024-12-15 PROCEDURE — 2580000003 HC RX 258: Performed by: FAMILY MEDICINE

## 2024-12-15 PROCEDURE — 6370000000 HC RX 637 (ALT 250 FOR IP): Performed by: EMERGENCY MEDICINE

## 2024-12-15 PROCEDURE — 2580000003 HC RX 258: Performed by: EMERGENCY MEDICINE

## 2024-12-15 PROCEDURE — 80048 BASIC METABOLIC PNL TOTAL CA: CPT

## 2024-12-15 PROCEDURE — 99222 1ST HOSP IP/OBS MODERATE 55: CPT | Performed by: FAMILY MEDICINE

## 2024-12-15 PROCEDURE — 82947 ASSAY GLUCOSE BLOOD QUANT: CPT

## 2024-12-15 PROCEDURE — 6370000000 HC RX 637 (ALT 250 FOR IP): Performed by: NURSE PRACTITIONER

## 2024-12-15 PROCEDURE — 6370000000 HC RX 637 (ALT 250 FOR IP): Performed by: FAMILY MEDICINE

## 2024-12-15 PROCEDURE — 1200000000 HC SEMI PRIVATE

## 2024-12-15 PROCEDURE — 51798 US URINE CAPACITY MEASURE: CPT

## 2024-12-15 PROCEDURE — 84300 ASSAY OF URINE SODIUM: CPT

## 2024-12-15 RX ORDER — APIXABAN 5 MG/1
5 TABLET, FILM COATED ORAL 2 TIMES DAILY
COMMUNITY
Start: 2024-11-22

## 2024-12-15 RX ORDER — CALCIUM CARBONATE 500 MG/1
500 TABLET, CHEWABLE ORAL 3 TIMES DAILY
Status: DISCONTINUED | OUTPATIENT
Start: 2024-12-15 | End: 2024-12-17 | Stop reason: HOSPADM

## 2024-12-15 RX ORDER — PREGABALIN 75 MG/1
150 CAPSULE ORAL 3 TIMES DAILY
Status: DISCONTINUED | OUTPATIENT
Start: 2024-12-15 | End: 2024-12-15

## 2024-12-15 RX ORDER — PREGABALIN 100 MG/1
100 CAPSULE ORAL DAILY
Status: DISCONTINUED | OUTPATIENT
Start: 2024-12-15 | End: 2024-12-15

## 2024-12-15 RX ORDER — INSULIN GLARGINE 100 [IU]/ML
6 INJECTION, SOLUTION SUBCUTANEOUS NIGHTLY
Status: DISCONTINUED | OUTPATIENT
Start: 2024-12-15 | End: 2024-12-17 | Stop reason: HOSPADM

## 2024-12-15 RX ORDER — SODIUM CHLORIDE 9 MG/ML
INJECTION, SOLUTION INTRAVENOUS PRN
Status: DISCONTINUED | OUTPATIENT
Start: 2024-12-15 | End: 2024-12-17 | Stop reason: HOSPADM

## 2024-12-15 RX ORDER — PANTOPRAZOLE SODIUM 40 MG/1
40 TABLET, DELAYED RELEASE ORAL
Status: DISCONTINUED | OUTPATIENT
Start: 2024-12-16 | End: 2024-12-17 | Stop reason: HOSPADM

## 2024-12-15 RX ORDER — HYDROXYZINE HYDROCHLORIDE 25 MG/1
25 TABLET, FILM COATED ORAL 3 TIMES DAILY PRN
Status: DISCONTINUED | OUTPATIENT
Start: 2024-12-15 | End: 2024-12-15

## 2024-12-15 RX ORDER — INSULIN LISPRO 100 [IU]/ML
0-8 INJECTION, SOLUTION INTRAVENOUS; SUBCUTANEOUS
Status: DISCONTINUED | OUTPATIENT
Start: 2024-12-15 | End: 2024-12-17 | Stop reason: HOSPADM

## 2024-12-15 RX ORDER — VITAMIN B COMPLEX
2000 TABLET ORAL DAILY
Status: DISCONTINUED | OUTPATIENT
Start: 2024-12-15 | End: 2024-12-17 | Stop reason: HOSPADM

## 2024-12-15 RX ORDER — INSULIN GLARGINE 100 [IU]/ML
19 INJECTION, SOLUTION SUBCUTANEOUS EVERY MORNING
Status: DISCONTINUED | OUTPATIENT
Start: 2024-12-15 | End: 2024-12-17 | Stop reason: HOSPADM

## 2024-12-15 RX ORDER — ACETAMINOPHEN 650 MG/1
650 SUPPOSITORY RECTAL EVERY 6 HOURS PRN
Status: DISCONTINUED | OUTPATIENT
Start: 2024-12-15 | End: 2024-12-17 | Stop reason: HOSPADM

## 2024-12-15 RX ORDER — TRAMADOL HYDROCHLORIDE 50 MG/1
50 TABLET ORAL EVERY 6 HOURS PRN
COMMUNITY
Start: 2024-12-06

## 2024-12-15 RX ORDER — 0.9 % SODIUM CHLORIDE 0.9 %
1000 INTRAVENOUS SOLUTION INTRAVENOUS ONCE
Status: COMPLETED | OUTPATIENT
Start: 2024-12-15 | End: 2024-12-15

## 2024-12-15 RX ORDER — CLONAZEPAM 0.5 MG/1
0.5 TABLET ORAL EVERY 12 HOURS PRN
Status: DISCONTINUED | OUTPATIENT
Start: 2024-12-15 | End: 2024-12-17 | Stop reason: HOSPADM

## 2024-12-15 RX ORDER — PREGABALIN 75 MG/1
150 CAPSULE ORAL 3 TIMES DAILY
Status: DISCONTINUED | OUTPATIENT
Start: 2024-12-15 | End: 2024-12-17 | Stop reason: HOSPADM

## 2024-12-15 RX ORDER — SODIUM CHLORIDE 0.9 % (FLUSH) 0.9 %
5-40 SYRINGE (ML) INJECTION EVERY 12 HOURS SCHEDULED
Status: DISCONTINUED | OUTPATIENT
Start: 2024-12-15 | End: 2024-12-17 | Stop reason: HOSPADM

## 2024-12-15 RX ORDER — DEXTROSE MONOHYDRATE 100 MG/ML
INJECTION, SOLUTION INTRAVENOUS CONTINUOUS PRN
Status: DISCONTINUED | OUTPATIENT
Start: 2024-12-15 | End: 2024-12-17 | Stop reason: HOSPADM

## 2024-12-15 RX ORDER — POLYETHYLENE GLYCOL 3350 17 G/17G
17 POWDER, FOR SOLUTION ORAL DAILY
Status: DISCONTINUED | OUTPATIENT
Start: 2024-12-15 | End: 2024-12-17 | Stop reason: HOSPADM

## 2024-12-15 RX ORDER — ACETAMINOPHEN 325 MG/1
650 TABLET ORAL EVERY 6 HOURS PRN
Status: DISCONTINUED | OUTPATIENT
Start: 2024-12-15 | End: 2024-12-17 | Stop reason: HOSPADM

## 2024-12-15 RX ORDER — ONDANSETRON 4 MG/1
4 TABLET, ORALLY DISINTEGRATING ORAL 3 TIMES DAILY PRN
Status: DISCONTINUED | OUTPATIENT
Start: 2024-12-15 | End: 2024-12-15 | Stop reason: SDUPTHER

## 2024-12-15 RX ORDER — TAMSULOSIN HYDROCHLORIDE 0.4 MG/1
0.4 CAPSULE ORAL DAILY
Status: DISCONTINUED | OUTPATIENT
Start: 2024-12-15 | End: 2024-12-17 | Stop reason: HOSPADM

## 2024-12-15 RX ORDER — FOLIC ACID 1 MG/1
1 TABLET ORAL DAILY
Status: DISCONTINUED | OUTPATIENT
Start: 2024-12-15 | End: 2024-12-17 | Stop reason: HOSPADM

## 2024-12-15 RX ORDER — ENOXAPARIN SODIUM 100 MG/ML
30 INJECTION SUBCUTANEOUS DAILY
Status: DISCONTINUED | OUTPATIENT
Start: 2024-12-15 | End: 2024-12-15

## 2024-12-15 RX ORDER — ONDANSETRON 4 MG/1
4 TABLET, ORALLY DISINTEGRATING ORAL EVERY 8 HOURS PRN
Status: DISCONTINUED | OUTPATIENT
Start: 2024-12-15 | End: 2024-12-17 | Stop reason: HOSPADM

## 2024-12-15 RX ORDER — VERAPAMIL HYDROCHLORIDE 240 MG/1
120 TABLET, FILM COATED, EXTENDED RELEASE ORAL NIGHTLY
Status: DISCONTINUED | OUTPATIENT
Start: 2024-12-15 | End: 2024-12-17 | Stop reason: HOSPADM

## 2024-12-15 RX ORDER — SODIUM CHLORIDE 9 MG/ML
INJECTION, SOLUTION INTRAVENOUS CONTINUOUS
Status: DISCONTINUED | OUTPATIENT
Start: 2024-12-15 | End: 2024-12-16

## 2024-12-15 RX ORDER — SODIUM CHLORIDE 0.9 % (FLUSH) 0.9 %
5-40 SYRINGE (ML) INJECTION PRN
Status: DISCONTINUED | OUTPATIENT
Start: 2024-12-15 | End: 2024-12-17 | Stop reason: HOSPADM

## 2024-12-15 RX ORDER — ATORVASTATIN CALCIUM 40 MG/1
40 TABLET, FILM COATED ORAL NIGHTLY
Status: DISCONTINUED | OUTPATIENT
Start: 2024-12-15 | End: 2024-12-17 | Stop reason: HOSPADM

## 2024-12-15 RX ORDER — TRAMADOL HYDROCHLORIDE 50 MG/1
50 TABLET ORAL EVERY 4 HOURS PRN
Status: DISCONTINUED | OUTPATIENT
Start: 2024-12-15 | End: 2024-12-17 | Stop reason: HOSPADM

## 2024-12-15 RX ORDER — ONDANSETRON 2 MG/ML
4 INJECTION INTRAMUSCULAR; INTRAVENOUS EVERY 6 HOURS PRN
Status: DISCONTINUED | OUTPATIENT
Start: 2024-12-15 | End: 2024-12-17 | Stop reason: HOSPADM

## 2024-12-15 RX ADMIN — APIXABAN 5 MG: 5 TABLET, FILM COATED ORAL at 21:46

## 2024-12-15 RX ADMIN — INSULIN GLARGINE 6 UNITS: 100 INJECTION, SOLUTION SUBCUTANEOUS at 21:46

## 2024-12-15 RX ADMIN — TAMSULOSIN HYDROCHLORIDE 0.4 MG: 0.4 CAPSULE ORAL at 15:28

## 2024-12-15 RX ADMIN — FOLIC ACID 1 MG: 1 TABLET ORAL at 15:28

## 2024-12-15 RX ADMIN — PREGABALIN 150 MG: 75 CAPSULE ORAL at 21:46

## 2024-12-15 RX ADMIN — SODIUM CHLORIDE: 9 INJECTION, SOLUTION INTRAVENOUS at 14:36

## 2024-12-15 RX ADMIN — ATORVASTATIN CALCIUM 40 MG: 40 TABLET, FILM COATED ORAL at 21:46

## 2024-12-15 RX ADMIN — INSULIN HUMAN 12 UNITS: 100 INJECTION, SOLUTION PARENTERAL at 10:55

## 2024-12-15 RX ADMIN — PREGABALIN 150 MG: 75 CAPSULE ORAL at 15:28

## 2024-12-15 RX ADMIN — CLONAZEPAM 0.5 MG: 0.5 TABLET ORAL at 19:55

## 2024-12-15 RX ADMIN — SODIUM CHLORIDE 1000 ML: 9 INJECTION, SOLUTION INTRAVENOUS at 08:13

## 2024-12-15 RX ADMIN — TRAMADOL HYDROCHLORIDE 50 MG: 50 TABLET, COATED ORAL at 15:30

## 2024-12-15 RX ADMIN — Medication 2000 UNITS: at 15:28

## 2024-12-15 RX ADMIN — VERAPAMIL HYDROCHLORIDE 120 MG: 240 TABLET, FILM COATED, EXTENDED RELEASE ORAL at 21:46

## 2024-12-15 RX ADMIN — INSULIN LISPRO 2 UNITS: 100 INJECTION, SOLUTION INTRAVENOUS; SUBCUTANEOUS at 21:55

## 2024-12-15 RX ADMIN — Medication 500 MG: at 21:52

## 2024-12-15 ASSESSMENT — PAIN DESCRIPTION - LOCATION: LOCATION: ARM;LEG

## 2024-12-15 ASSESSMENT — PAIN DESCRIPTION - PAIN TYPE: TYPE: CHRONIC PAIN

## 2024-12-15 ASSESSMENT — PAIN SCALES - GENERAL
PAINLEVEL_OUTOF10: 3
PAINLEVEL_OUTOF10: 7

## 2024-12-15 ASSESSMENT — PAIN - FUNCTIONAL ASSESSMENT
PAIN_FUNCTIONAL_ASSESSMENT: ACTIVITIES ARE NOT PREVENTED
PAIN_FUNCTIONAL_ASSESSMENT: NONE - DENIES PAIN

## 2024-12-15 NOTE — H&P
time.      Motor: No tremor.   Psychiatric:         Behavior: Behavior is cooperative.         Investigations:      Laboratory Testing:  Recent Results (from the past 24 hour(s))   POC Glucose Fingerstick    Collection Time: 12/15/24  7:57 AM   Result Value Ref Range    POC Glucose 487 (HH) 65 - 105 mg/dL   BMP    Collection Time: 12/15/24  7:59 AM   Result Value Ref Range    Sodium 133 (L) 136 - 145 mmol/L    Potassium 4.5 3.7 - 5.3 mmol/L    Chloride 101 98 - 107 mmol/L    CO2 22 20 - 31 mmol/L    Anion Gap 10 9 - 16 mmol/L    Glucose 482 (HH) 82 - 115 mg/dL    BUN 45 (H) 8 - 23 mg/dL    Creatinine 1.8 (H) 0.50 - 0.90 mg/dL    Est, Glom Filt Rate 30 (L) >60 mL/min/1.73m2    Calcium 8.7 (L) 8.8 - 10.2 mg/dL   CBC with Auto Differential    Collection Time: 12/15/24  7:59 AM   Result Value Ref Range    WBC 5.4 3.5 - 11.3 k/uL    RBC 4.05 3.95 - 5.11 m/uL    Hemoglobin 12.2 11.9 - 15.1 g/dL    Hematocrit 35.8 (L) 36.3 - 47.1 %    MCV 88.4 82.6 - 102.9 fL    MCH 30.1 25.2 - 33.5 pg    MCHC 34.1 28.4 - 34.8 g/dL    RDW 12.7 11.8 - 14.4 %    Platelets 230 138 - 453 k/uL    MPV 10.4 8.1 - 13.5 fL    NRBC Automated 0.0 0.0 per 100 WBC    Neutrophils % 60 36 - 65 %    Lymphocytes % 23 (L) 24 - 43 %    Monocytes % 11 3 - 12 %    Eosinophils % 4 1 - 4 %    Basophils % 1 0 - 2 %    Immature Granulocytes % 1 (H) 0 %    Neutrophils Absolute 3.34 1.50 - 8.10 k/uL    Lymphocytes Absolute 1.24 1.10 - 3.70 k/uL    Monocytes Absolute 0.58 0.10 - 1.20 k/uL    Eosinophils Absolute 0.21 0.00 - 0.44 k/uL    Basophils Absolute 0.04 0.00 - 0.20 k/uL    Immature Granulocytes Absolute 0.03 0.00 - 0.30 k/uL   POC Glucose Fingerstick    Collection Time: 12/15/24  9:49 AM   Result Value Ref Range    POC Glucose 338 (H) 65 - 105 mg/dL       Imaging/[employer]:    XR FOOT RIGHT (MIN 3 VIEWS)    Result Date: 11/26/2024  Negative for fracture    MRI BRAIN W WO CONTRAST    Result Date: 11/20/2024  MRI brain: HISTORY: Gait disturbance and frequent

## 2024-12-15 NOTE — PLAN OF CARE
D/c plan pending. Pt remains A&O x 4, on RA. No new s/s of skin breakdown or injury. Safety protocols in place and enforced. Pt ambulates SB 1x stay with me. Pt had delgadillo placed for retention, see notes for more details. Pt has minimal c/o pain, pt takes tramadol ATC at home for generalized arthritis pain, per pt.     Problem: Chronic Conditions and Co-morbidities  Goal: Patient's chronic conditions and co-morbidity symptoms are monitored and maintained or improved  Outcome: Progressing     Problem: Discharge Planning  Goal: Discharge to home or other facility with appropriate resources  Outcome: Progressing     Problem: Safety - Adult  Goal: Free from fall injury  Outcome: Progressing     Problem: ABCDS Injury Assessment  Goal: Absence of physical injury  Outcome: Progressing     Problem: Skin/Tissue Integrity - Adult  Goal: Skin integrity remains intact  Reactivated     Problem: Genitourinary - Adult  Goal: Absence of urinary retention  Reactivated  Goal: Urinary catheter remains patent  Reactivated     Problem: Infection - Adult  Goal: Absence of infection at discharge  Reactivated  Goal: Absence of infection during hospitalization  Reactivated  Goal: Absence of fever/infection during anticipated neutropenic period  Reactivated

## 2024-12-15 NOTE — ED PROVIDER NOTES
[facility] EMERGENCY DEPARTMENT  eMERGENCY dEPARTMENT eNCOUnter    Pt Name: Camille Garduno  MRN: [id number]  Birthdate 1955  Date of evaluation: 12/15/24  CHIEF COMPLAINT       Chief Complaint   Patient presents with    Blood Sugar Problem     Patient BS at home 538, before she checked it she had a glass of egg nog. Cannot read her sliding scale at home. Administered 19 units of lantus. Called 911 d/t inability to read own sliding scale and knowing how much to administer. Patient stated \"I believe I have TD or Rhabdo\".      HISTORY OF PRESENT ILLNESS   HPI  Patient presents secondary to elevated blood sugar.  Patient describes feeling shaky and states she was unable to give herself her own insulin.  Patient states she also cannot read her own handwriting and therefore does not know how much insulin to give herself based off her sliding scale.  Patient reports that after she found her sugars to be elevated today she did have a cup of egg nog.  Concerned that the patient is unable to comply with both her insulin regimen as well as her diet.  Patient resides at a independent living center.  REVIEW OF SYSTEMS     Constitutional: No fever  Eye: No visual changes  Ear/Nose/Mouth/Throat: No sore throat  Respiratory: No shortness of breath  Cardiovascular: No chest pain  Gastrointestinal: No nausea, no vomiting, no diarrhea  Genitourinary: No dysuria  Musculoskeletal: No joint pain  Integumentary: No rash  Neurologic: No dizziness  Psychiatric: No anxiety, no depression  All systems otherwise negative.        PAST MEDICAL HISTORY     Past Medical History:   Diagnosis Date    Anorexia     Diabetes mellitus (HCC)     GERD (gastroesophageal reflux disease)     Hypertension     Left foot drop 2008    Movement disorder     MVP (mitral valve prolapse)     Short-term memory loss     Stroke (HCC) 2000     SURGICAL HISTORY       Past Surgical History:   Procedure Laterality Date    CATARACT EXTRACTION Right 2018    ORIF

## 2024-12-16 ENCOUNTER — APPOINTMENT (OUTPATIENT)
Dept: ULTRASOUND IMAGING | Age: 69
DRG: 638 | End: 2024-12-16
Payer: COMMERCIAL

## 2024-12-16 LAB
ALBUMIN SERPL-MCNC: 3.4 G/DL (ref 3.5–5.2)
ALBUMIN/GLOB SERPL: 1.5 {RATIO} (ref 1–2.5)
ALP SERPL-CCNC: 57 U/L (ref 35–104)
ALT SERPL-CCNC: 18 U/L (ref 10–35)
ANION GAP SERPL CALCULATED.3IONS-SCNC: 8 MMOL/L (ref 9–16)
AST SERPL-CCNC: 18 U/L (ref 10–35)
BILIRUB SERPL-MCNC: 0.3 MG/DL (ref 0–1.2)
BUN SERPL-MCNC: 21 MG/DL (ref 8–23)
CALCIUM SERPL-MCNC: 9.3 MG/DL (ref 8.8–10.2)
CHLORIDE SERPL-SCNC: 109 MMOL/L (ref 98–107)
CO2 SERPL-SCNC: 24 MMOL/L (ref 20–31)
CREAT SERPL-MCNC: 0.8 MG/DL (ref 0.5–0.9)
GFR, ESTIMATED: 78 ML/MIN/1.73M2
GLUCOSE BLD-MCNC: 143 MG/DL (ref 65–105)
GLUCOSE BLD-MCNC: 171 MG/DL (ref 65–105)
GLUCOSE BLD-MCNC: 255 MG/DL (ref 65–105)
GLUCOSE BLD-MCNC: 303 MG/DL (ref 65–105)
GLUCOSE SERPL-MCNC: 176 MG/DL (ref 82–115)
INR PPP: 1.3
MAGNESIUM SERPL-MCNC: 1.8 MG/DL (ref 1.6–2.4)
POTASSIUM SERPL-SCNC: 4.4 MMOL/L (ref 3.7–5.3)
PROT SERPL-MCNC: 5.7 G/DL (ref 6.6–8.7)
PROTHROMBIN TIME: 15.7 SEC (ref 11.5–14.2)
SODIUM SERPL-SCNC: 141 MMOL/L (ref 136–145)

## 2024-12-16 PROCEDURE — 51701 INSERT BLADDER CATHETER: CPT

## 2024-12-16 PROCEDURE — 76770 US EXAM ABDO BACK WALL COMP: CPT

## 2024-12-16 PROCEDURE — 85610 PROTHROMBIN TIME: CPT

## 2024-12-16 PROCEDURE — 2580000003 HC RX 258: Performed by: FAMILY MEDICINE

## 2024-12-16 PROCEDURE — 80053 COMPREHEN METABOLIC PANEL: CPT

## 2024-12-16 PROCEDURE — 51798 US URINE CAPACITY MEASURE: CPT

## 2024-12-16 PROCEDURE — 6370000000 HC RX 637 (ALT 250 FOR IP): Performed by: NURSE PRACTITIONER

## 2024-12-16 PROCEDURE — 83735 ASSAY OF MAGNESIUM: CPT

## 2024-12-16 PROCEDURE — 82947 ASSAY GLUCOSE BLOOD QUANT: CPT

## 2024-12-16 PROCEDURE — 36415 COLL VENOUS BLD VENIPUNCTURE: CPT

## 2024-12-16 PROCEDURE — 99239 HOSP IP/OBS DSCHRG MGMT >30: CPT | Performed by: FAMILY MEDICINE

## 2024-12-16 PROCEDURE — 6370000000 HC RX 637 (ALT 250 FOR IP): Performed by: FAMILY MEDICINE

## 2024-12-16 PROCEDURE — 1200000000 HC SEMI PRIVATE

## 2024-12-16 RX ORDER — TAMSULOSIN HYDROCHLORIDE 0.4 MG/1
0.4 CAPSULE ORAL DAILY
Qty: 30 CAPSULE | Refills: 0 | Status: SHIPPED | OUTPATIENT
Start: 2024-12-17

## 2024-12-16 RX ORDER — BENZOCAINE/MENTHOL 6 MG-10 MG
LOZENGE MUCOUS MEMBRANE 2 TIMES DAILY
Status: DISCONTINUED | OUTPATIENT
Start: 2024-12-16 | End: 2024-12-17 | Stop reason: HOSPADM

## 2024-12-16 RX ADMIN — PANTOPRAZOLE SODIUM 40 MG: 40 TABLET, DELAYED RELEASE ORAL at 06:31

## 2024-12-16 RX ADMIN — Medication 500 MG: at 14:46

## 2024-12-16 RX ADMIN — ATORVASTATIN CALCIUM 40 MG: 40 TABLET, FILM COATED ORAL at 20:52

## 2024-12-16 RX ADMIN — Medication 2000 UNITS: at 08:47

## 2024-12-16 RX ADMIN — Medication 500 MG: at 08:47

## 2024-12-16 RX ADMIN — TRAMADOL HYDROCHLORIDE 50 MG: 50 TABLET, COATED ORAL at 06:35

## 2024-12-16 RX ADMIN — INSULIN LISPRO 4 UNITS: 100 INJECTION, SOLUTION INTRAVENOUS; SUBCUTANEOUS at 17:40

## 2024-12-16 RX ADMIN — SODIUM CHLORIDE: 9 INJECTION, SOLUTION INTRAVENOUS at 04:04

## 2024-12-16 RX ADMIN — INSULIN GLARGINE 6 UNITS: 100 INJECTION, SOLUTION SUBCUTANEOUS at 20:52

## 2024-12-16 RX ADMIN — TRAMADOL HYDROCHLORIDE 50 MG: 50 TABLET, COATED ORAL at 15:21

## 2024-12-16 RX ADMIN — VERAPAMIL HYDROCHLORIDE 120 MG: 240 TABLET, FILM COATED, EXTENDED RELEASE ORAL at 20:52

## 2024-12-16 RX ADMIN — FOLIC ACID 1 MG: 1 TABLET ORAL at 08:47

## 2024-12-16 RX ADMIN — PREGABALIN 150 MG: 75 CAPSULE ORAL at 08:47

## 2024-12-16 RX ADMIN — PREGABALIN 150 MG: 75 CAPSULE ORAL at 14:45

## 2024-12-16 RX ADMIN — HYDROCORTISONE: 1 CREAM TOPICAL at 21:19

## 2024-12-16 RX ADMIN — CLONAZEPAM 0.5 MG: 0.5 TABLET ORAL at 21:19

## 2024-12-16 RX ADMIN — Medication 500 MG: at 20:52

## 2024-12-16 RX ADMIN — PREGABALIN 150 MG: 75 CAPSULE ORAL at 20:52

## 2024-12-16 RX ADMIN — INSULIN LISPRO 6 UNITS: 100 INJECTION, SOLUTION INTRAVENOUS; SUBCUTANEOUS at 12:24

## 2024-12-16 RX ADMIN — APIXABAN 5 MG: 5 TABLET, FILM COATED ORAL at 20:52

## 2024-12-16 RX ADMIN — SODIUM CHLORIDE, PRESERVATIVE FREE 10 ML: 5 INJECTION INTRAVENOUS at 08:48

## 2024-12-16 RX ADMIN — APIXABAN 5 MG: 5 TABLET, FILM COATED ORAL at 08:47

## 2024-12-16 RX ADMIN — INSULIN GLARGINE 19 UNITS: 100 INJECTION, SOLUTION SUBCUTANEOUS at 08:47

## 2024-12-16 RX ADMIN — TAMSULOSIN HYDROCHLORIDE 0.4 MG: 0.4 CAPSULE ORAL at 08:47

## 2024-12-16 RX ADMIN — SODIUM CHLORIDE, PRESERVATIVE FREE 10 ML: 5 INJECTION INTRAVENOUS at 22:13

## 2024-12-16 RX ADMIN — TRAMADOL HYDROCHLORIDE 50 MG: 50 TABLET, COATED ORAL at 22:13

## 2024-12-16 RX ADMIN — POLYETHYLENE GLYCOL 3350 17 G: 17 POWDER, FOR SOLUTION ORAL at 08:48

## 2024-12-16 ASSESSMENT — PAIN DESCRIPTION - ORIENTATION
ORIENTATION: RIGHT;LEFT
ORIENTATION: LEFT

## 2024-12-16 ASSESSMENT — PAIN SCALES - GENERAL
PAINLEVEL_OUTOF10: 7
PAINLEVEL_OUTOF10: 0
PAINLEVEL_OUTOF10: 4
PAINLEVEL_OUTOF10: 7
PAINLEVEL_OUTOF10: 0

## 2024-12-16 ASSESSMENT — ENCOUNTER SYMPTOMS
WHEEZING: 0
DIARRHEA: 0
NAUSEA: 0
ABDOMINAL PAIN: 0
RHINORRHEA: 0
BLOOD IN STOOL: 0
CONSTIPATION: 0
COUGH: 0
VOMITING: 0
CHEST TIGHTNESS: 0
SHORTNESS OF BREATH: 0

## 2024-12-16 ASSESSMENT — PAIN - FUNCTIONAL ASSESSMENT: PAIN_FUNCTIONAL_ASSESSMENT: ACTIVITIES ARE NOT PREVENTED

## 2024-12-16 ASSESSMENT — PAIN DESCRIPTION - DESCRIPTORS
DESCRIPTORS: ACHING
DESCRIPTORS: ACHING
DESCRIPTORS: ACHING;DISCOMFORT

## 2024-12-16 ASSESSMENT — PAIN DESCRIPTION - LOCATION
LOCATION: GENERALIZED
LOCATION: BUTTOCKS

## 2024-12-16 NOTE — CARE COORDINATION
DC Planning    Spoke with Jeniffer from [facility]-they elected on site dt dxs and spoke with pt sister Bharati indicating they no longer want HHC.     Spoke with pt sister Bharati-SHE RELAYS SHE STILL WANTS HC-discussed options-[facility] offers Behavioral nursing and Bharati agrees.

## 2024-12-16 NOTE — PLAN OF CARE
Patient is A&Ox4 is able to ambulate with 1 assist with a walker. Mlechor catheter removed, Bladder scan showed 398, Primary notified, straight cath preformed, 550 ml out, urology consulted. Patient is a high fall risk, bed alarm is on, bed in lowest position, call device within reach   Problem: Discharge Planning  Goal: Discharge to home or other facility with appropriate resources  12/16/2024 0749 by Hailee Quach RN  Outcome: Progressing     Problem: Safety - Adult  Goal: Free from fall injury  12/16/2024 0749 by Hailee Quach RN  Outcome: Progressing     Problem: Skin/Tissue Integrity - Adult  Goal: Skin integrity remains intact  12/16/2024 0749 by Hailee Quach RN  Outcome: Progressing     Problem: Genitourinary - Adult  Goal: Absence of urinary retention  12/16/2024 0749 by Hailee Quach RN  Outcome: Progressing     Problem: Infection - Adult  Goal: Absence of infection at discharge  12/16/2024 0749 by Hailee Quach RN  Outcome: Progressing     Problem: Pain  Goal: Verbalizes/displays adequate comfort level or baseline comfort level  12/16/2024 0749 by Hailee Quach RN  Outcome: Progressing     Problem: Hematologic - Adult  Goal: Maintains hematologic stability  12/16/2024 0749 by Hailee Quach RN  Outcome: Progressing

## 2024-12-16 NOTE — CARE COORDINATION
DC Planing    Spoke with Ajith Granad pt has been accepted. Home care agency will pull data electronically.

## 2024-12-16 NOTE — CARE COORDINATION
Case Management Assessment  Initial Evaluation    Date/Time of Evaluation: 12/16/2024 11:23 AM  Assessment Completed by: Isabelle Rider RN    If patient is discharged prior to next notation, then this note serves as note for discharge by case management.    Patient Name: Camille Garduno                   YOB: 1955  Diagnosis: Hyperglycemia [R73.9]  CAM (acute kidney injury) (HCC) [N17.9]  Acute kidney injury (HCC) [N17.9]                   Date / Time: 12/15/2024  7:53 AM    Patient Admission Status: Inpatient   Readmission Risk (Low < 19, Mod (19-27), High > 27): Readmission Risk Score: 10.3    Current PCP: Dalia Burton APRN - CNP  PCP verified by CM? Yes    Chart Reviewed: Yes      History Provided by: Patient, Other (see comment) (legal guardian Bharati)  Patient Orientation: Alert and Oriented    Patient Cognition: Alert    Hospitalization in the last 30 days (Readmission):  No    If yes, Readmission Assessment in CM Navigator will be completed.    Advance Directives:      Code Status: Full Code   Patient's Primary Decision Maker is: Named in Scanned ACP Document      Discharge Planning:    Patient lives with: Alone Type of Home: Apartment (senior apt)  Primary Care Giver: Self  Patient Support Systems include: Family Members, /, Therapist, Other (Comment) (has Legal guardian)   Current Financial resources: Medicaid, Medicare  Current community resources: Psychiatric Treatment, Housing ([facility] has psych/counselor)  Current services prior to admission: Durable Medical Equipment            Current DME: Walker            Type of Home Care services:  Safety Alert, PT    ADLS  Prior functional level: Independent in ADLs/IADLs, Assistance with the following:, Cooking, Housework, Shopping  Current functional level: Cooking, Housework, Shopping, Mobility, Independent in ADLs/IADLs, Assistance with the following:    PT AM-PAC:   /24  OT AM-PAC:   /24    Family can provide

## 2024-12-16 NOTE — DISCHARGE INSTR - COC
Continuity of Care Form    Patient Name: Camille Garduno   :  1955  MRN:  [id number]    Admit date:  12/15/2024  Discharge date:      Code Status Order: Full Code   Advance Directives:   Advance Care Flowsheet Documentation             Admitting Physician:  Donna Esqueda MD  PCP: Dalia Burton, APRN - CNP    Discharging Nurse: Hailee TOLEDO  Discharging Hospital Unit/Room#: 1011/1011-02  Discharging Unit Phone Number: [phone]    Emergency Contact:   Extended Emergency Contact Information  Primary Emergency Contact: Bello Garduno  Address: [address] [place], [place] [id number]           [place] [address]  Home Phone: [phone]  Mobile Phone: [phone]  Relation: Spouse  Secondary Emergency Contact: Bharati Murray  Home Phone: [phone]  Mobile Phone: [phone]  Relation: Brother/Sister  Guardian: Cierra Suarez  Work Phone: [phone]  Mobile Phone: [phone]  Relation: None  Preferred language: English   needed? No    Past Surgical History:  Past Surgical History:   Procedure Laterality Date    CATARACT EXTRACTION Right     ORIF HUMERUS FRACTURE      TOE AMPUTATION Left 2023       Immunization History:   Immunization History   Administered Date(s) Administered    COVID-19, MODERNA BLUE border, Primary or Immunocompromised, (age 12y+), IM, 100 mcg/0.5mL 04/15/2021, 2021, 2021    COVID-19, NOVAVAX, (age 12y+), IM, 5mcg/0.5mL 10/18/2024    COVID-19, PFIZER, (age 12y+), IM, 30mcg/0.3mL 10/19/2023       Active Problems:  Patient Active Problem List   Diagnosis Code    CAM (acute kidney injury) (HCC) N17.9    Hyperosmolar hyperglycemic state (HHS) ([facility]) E11.00    Age related osteoporosis M81.0    Mixed hyperlipidemia E78.2    Neuropathy G62.9    Obsessive-compulsive disorder F42.9    Plantar fasciitis of right foot M72.2    Primary generalized (osteo)arthritis M15.0    Recurrent UTI N39.0    Retention of urine R33.9    Sensory ataxia R27.8    Urge incontinence N39.41

## 2024-12-16 NOTE — PLAN OF CARE
Uneventful night, pt slept with no complaints, pt's delgadillo remains intact and draining   Problem: Chronic Conditions and Co-morbidities  Goal: Patient's chronic conditions and co-morbidity symptoms are monitored and maintained or improved  12/16/2024 0446 by Justino Groves RN  Outcome: Progressing  Flowsheets (Taken 12/15/2024 2156)  Care Plan - Patient's Chronic Conditions and Co-Morbidity Symptoms are Monitored and Maintained or Improved: Monitor and assess patient's chronic conditions and comorbid symptoms for stability, deterioration, or improvement     Problem: Discharge Planning  Goal: Discharge to home or other facility with appropriate resources  12/16/2024 0454 by Justino Groves RN  Outcome: Progressing     Problem: Safety - Adult  Goal: Free from fall injury  12/16/2024 0454 by Justino Groves RN  Outcome: Progressing     Problem: ABCDS Injury Assessment  Goal: Absence of physical injury  12/16/2024 0454 by Justino Groves RN  Outcome: Progressing     Problem: Skin/Tissue Integrity - Adult  Goal: Skin integrity remains intact  12/16/2024 0446 by Justino Groves RN  Outcome: Progressing  Flowsheets (Taken 12/15/2024 2156)  Skin Integrity Remains Intact:   Monitor for areas of redness and/or skin breakdown   Assess vascular access sites hourly     Problem: Genitourinary - Adult  Goal: Absence of urinary retention  12/16/2024 0446 by Justino Groves RN  Outcome: Progressing  Flowsheets (Taken 12/15/2024 2156)  Absence of urinary retention:   Assess patient’s ability to void and empty bladder   Monitor intake/output and perform bladder scan as needed   Place urinary catheter per Licensed Independent Practitioner order if needed     Problem: Genitourinary - Adult  Goal: Urinary catheter remains patent  12/16/2024 0446 by Justino Groves RN  Outcome: Progressing  Flowsheets (Taken 12/15/2024 2156)  Urinary catheter remains patent: Assess patency of urinary catheter

## 2024-12-17 VITALS
DIASTOLIC BLOOD PRESSURE: 52 MMHG | SYSTOLIC BLOOD PRESSURE: 105 MMHG | TEMPERATURE: 98.2 F | BODY MASS INDEX: 20.72 KG/M2 | RESPIRATION RATE: 16 BRPM | HEIGHT: 66 IN | HEART RATE: 85 BPM | OXYGEN SATURATION: 93 % | WEIGHT: 128.9 LBS

## 2024-12-17 LAB
GLUCOSE BLD-MCNC: 145 MG/DL (ref 65–105)
GLUCOSE BLD-MCNC: 186 MG/DL (ref 65–105)

## 2024-12-17 PROCEDURE — 2580000003 HC RX 258: Performed by: FAMILY MEDICINE

## 2024-12-17 PROCEDURE — 97166 OT EVAL MOD COMPLEX 45 MIN: CPT

## 2024-12-17 PROCEDURE — 6370000000 HC RX 637 (ALT 250 FOR IP): Performed by: FAMILY MEDICINE

## 2024-12-17 PROCEDURE — 51798 US URINE CAPACITY MEASURE: CPT

## 2024-12-17 PROCEDURE — 82947 ASSAY GLUCOSE BLOOD QUANT: CPT

## 2024-12-17 PROCEDURE — 97530 THERAPEUTIC ACTIVITIES: CPT

## 2024-12-17 PROCEDURE — 97535 SELF CARE MNGMENT TRAINING: CPT

## 2024-12-17 PROCEDURE — 99239 HOSP IP/OBS DSCHRG MGMT >30: CPT | Performed by: STUDENT IN AN ORGANIZED HEALTH CARE EDUCATION/TRAINING PROGRAM

## 2024-12-17 PROCEDURE — 97162 PT EVAL MOD COMPLEX 30 MIN: CPT

## 2024-12-17 RX ORDER — BENZOCAINE/MENTHOL 6 MG-10 MG
LOZENGE MUCOUS MEMBRANE
Qty: 30 G | Refills: 1 | Status: SHIPPED | OUTPATIENT
Start: 2024-12-17 | End: 2024-12-24

## 2024-12-17 RX ADMIN — PREGABALIN 150 MG: 75 CAPSULE ORAL at 08:20

## 2024-12-17 RX ADMIN — POLYETHYLENE GLYCOL 3350 17 G: 17 POWDER, FOR SOLUTION ORAL at 08:21

## 2024-12-17 RX ADMIN — TAMSULOSIN HYDROCHLORIDE 0.4 MG: 0.4 CAPSULE ORAL at 08:20

## 2024-12-17 RX ADMIN — FOLIC ACID 1 MG: 1 TABLET ORAL at 08:20

## 2024-12-17 RX ADMIN — APIXABAN 5 MG: 5 TABLET, FILM COATED ORAL at 08:20

## 2024-12-17 RX ADMIN — Medication 500 MG: at 08:25

## 2024-12-17 RX ADMIN — SODIUM CHLORIDE, PRESERVATIVE FREE 10 ML: 5 INJECTION INTRAVENOUS at 08:21

## 2024-12-17 RX ADMIN — Medication 2000 UNITS: at 08:20

## 2024-12-17 RX ADMIN — INSULIN LISPRO 2 UNITS: 100 INJECTION, SOLUTION INTRAVENOUS; SUBCUTANEOUS at 08:20

## 2024-12-17 RX ADMIN — INSULIN GLARGINE 19 UNITS: 100 INJECTION, SOLUTION SUBCUTANEOUS at 08:20

## 2024-12-17 RX ADMIN — PANTOPRAZOLE SODIUM 40 MG: 40 TABLET, DELAYED RELEASE ORAL at 06:33

## 2024-12-17 NOTE — PROGRESS NOTES
Bladder scan completed d/t decrease urine output. Pt tolerated procedure well. Physician notified. Urine Assessment  Urinary Incontinence: Absent  Urine Color: Yellow/straw  Bladder Scan Volume (mL): 1155 mL  $ Bladder scan: $ Yes  Post Void Cath Residual (mL): 1155    Writer obtained order to place delgadillo, pt tolerated procedure well. Writer emptied about 600 mL then clamped line and waited a bit before completely emptying bladder to prevent bladder spasms. Writer obtained a total of 1500 mL .  
Pt arrived to floor via stretcher, room 1011   Writer at bedside, orders released, VSS, assessment completed. Pt oriented to call light and safety protocols, telemtry applied mx 66 pt verbalized understanding.          [] Medication Reconciliation was completed and the patient's home medication list was verified. The Med List Status is \"Complete\". The following sources were used to assist with Medication Reconciliation:    [] Med Rec Pharmacist already completed   [] Patient had a list of medications which was transcribed into the EHR.  [] Patient provided bottles of their medications  [x] Home medications reviewed and confirmed with pt and dispense report  [] Contacted patient's pharmacy to confirm home medications  [] Contacted patient's physician office to confirm home medications  [] Medical Records from another facility and/or Care Everywhere were reviewed  [] MAR from facility requested to be faxed over  [] Unable to complete due to patient condition  [] Unable to validate home medications      [] There are one or more home medications that need clarification before Medication Reconciliation can be completed. The Med List Status has been marked as In Progress.     To assist with Home Medication Reconciliation the following actions have been taken:    [] Pharmacy medication reconciliation service requested. (Note: This can be done by sending a Perfect Serve message to The Med Rec Pharmacist or by phoning [phone].)  [] Family requested to bring medications into the hospital  [] Family requested to call hospital with medication list  [] Message left with physician office  [] Request for medical records made to n/a  [] Other n/a        
diarrhea, nausea and vomiting.   Genitourinary:  Positive for difficulty urinating. Negative for dysuria, enuresis, frequency and hematuria.   Musculoskeletal:  Negative for arthralgias and myalgias.   Skin:  Negative for rash.   Neurological:  Negative for dizziness, weakness, light-headedness and headaches.   Hematological:  Does not bruise/bleed easily.   Psychiatric/Behavioral:  Negative for dysphoric mood and sleep disturbance.      Objective :      Current Vitals : Temp: 98 °F (36.7 °C),  Pulse: 70, Respirations: 16, BP: (!) 156/77, SpO2: 94 %  Last 24 Hrs Vitals   Patient Vitals for the past 24 hrs:   BP Temp Temp src Pulse Resp SpO2 Height Weight   12/16/24 0635 -- -- -- -- 16 -- -- --   12/16/24 0420 (!) 156/77 98 °F (36.7 °C) Oral 70 20 94 % -- --   12/16/24 0005 (!) 168/72 98.2 °F (36.8 °C) Oral 70 17 96 % -- --   12/15/24 2009 (!) 172/85 98.2 °F (36.8 °C) Oral 75 18 95 % -- --   12/15/24 1530 113/84 97.9 °F (36.6 °C) Oral 72 18 100 % -- --   12/15/24 1130 (!) 172/84 98.2 °F (36.8 °C) Oral 70 16 97 % -- --   12/15/24 1115 (!) 150/73 -- -- -- -- -- -- --   12/15/24 0946 -- -- -- -- -- 100 % -- --   12/15/24 0945 (!) 154/71 -- -- 69 15 -- -- --   12/15/24 0804 (!) 154/68 98.1 °F (36.7 °C) Oral 74 16 -- 1.676 m (5' 6\") 61.2 kg (135 lb)   12/15/24 0757 -- -- -- -- -- 99 % -- --     Intake / output   12/14 1901 - 12/16 0700  In: 1675.4 [P.O.:1435; I.V.:240.4]  Out: 6280 [Urine:6280]  Physical Exam:  Physical Exam  Vitals and nursing note reviewed.   Constitutional:       General: She is not in acute distress.     Appearance: She is not diaphoretic.   HENT:      Head: Normocephalic and atraumatic.      Nose:      Right Sinus: No maxillary sinus tenderness or frontal sinus tenderness.      Left Sinus: No maxillary sinus tenderness or frontal sinus tenderness.      Mouth/Throat:      Pharynx: No oropharyngeal exudate.   Eyes:      General: No scleral icterus.     Conjunctiva/sclera: Conjunctivae normal.      
chair alarm on; call light in reach)  Bed Mobility Comments: HOB elevated; supine to sit EOB with SBA; cues to scan environment and slow down due to patient demonstrating impulsive behavior to stand  Transfers  Sit to Stand: Contact guard assistance  Stand to Sit: Contact guard assistance  Bed to Chair: Contact guard assistance  Comments: STS from EOB and bedside chair; poor hand placement and AD management during transfers; Extra time required to educate patient on proper hand placement and AD management.   Ambulation  WB Status: as tolerated  Ambulation  Surface: Level tile  Device: Rolling Walker  Assistance: Contact guard assistance  Gait Deviations: Slow Ana  Distance: 250'  Comments: VC to stay in close proximity and in center of AD.  No LOB.     Edu provided on complications resulting from immobility and decreased participation such as increased risk for blood clots, pneumonia, constipation, muscle atrophy, and decreased independence. Pt verbalized fair understanding.         AM-PAC - Mobility  AM-PAC Basic Mobility - Inpatient   How much help is needed turning from your back to your side while in a flat bed without using bedrails?: None  How much help is needed moving from lying on your back to sitting on the side of a flat bed without using bedrails?: A Little  How much help is needed moving to and from a bed to a chair?: A Little  How much help is needed standing up from a chair using your arms?: A Little  How much help is needed walking in hospital room?: A Little  How much help is needed climbing 3-5 steps with a railing?: A Little  [facility] : 19  AM-PAC Inpatient T-Scale Score : 45.44  Mobility Inpatient CMS 0-100% Score: 41.77  Mobility Inpatient CMS G-Code Modifier : CK        Goals  Short Term Goals  Time Frame for Short Term Goals: 12 visits  Short Term Goal 1: Patient will demonstrate bed mobility with MI  Short Term Goal 2: Patient will demonstrate functional 
Daily Activity - Inpatient   How much help is needed for putting on and taking off regular lower body clothing?: A Little  How much help is needed for bathing (which includes washing, rinsing, drying)?: A Little  How much help is needed for toileting (which includes using toilet, bedpan, or urinal)?: A Little  How much help is needed for putting on and taking off regular upper body clothing?: A Little  How much help is needed for taking care of personal grooming?: A Little  How much help for eating meals?: None  AM-[facility] Inpatient Daily Activity Raw Score: 19  AM-PAC Inpatient ADL T-Scale Score : 40.22  ADL Inpatient CMS 0-100% Score: 42.8  ADL Inpatient CMS G-Code Modifier : CK        Goals  Short Term Goals  Time Frame for Short Term Goals: By discharge, pt to demo:  Short Term Goal 1: bed mobility tasks to MOD I/IND with Good safety and without use of bedrails.  Short Term Goal 2: ADL transfers and functional mobility tasks to MOD I with Good safety/pacing and use of AD.  Short Term Goal 3: UB/LB ADL routine to MOD I/IND with Good safety/pacing and use of AD/AE/compensatory strategies as needed.  Short Term Goal 4: increased standing tolerance to > 8 minutes to promote activity tolerance/balance required for increased safety/IND with ADL tasks.  Short Term Goal 5: Pt/caregivers to be IND with fall prevention strategies, EC/WS tech, condition specific education, potential equipment/discharge recommendations and a BUE HEP with use of handouts as needed.  Patient Goals   Patient goals : To go home today!      Therapy Time   Individual Concurrent Group Co-treatment   Time In 0925 (+10 mins for chart review & RN coordination)         Time Out 0959         Minutes 34+10=44         Tx Time: 23 minutes       Laly Bartholomew, OT

## 2024-12-17 NOTE — CARE COORDINATION
Discharge planning    Notified choco of the discharge order that was placed.     Will need to discuss with RN about discharge and patient has legal guardian listed. They will need to be notified prior to admission

## 2024-12-17 NOTE — CONSULTS
Suhas Mcnair MD  Urology Consultation    Patient:  Camille Garduno  MRN: [id number]  YOB: 1955    CHIEF COMPLAINT: Uncontrolled diabetes, urinary retention   Patient is here in conjunction with the nursing staff  HISTORY OF PRESENT ILLNESS:   The patient is a 69 y.o. female who presents with uncontrolled diabetes, urology consulted for urinary retention, the patient has a history of recurrent UTI urgency incontinence  The patient is under the care of [facility] urology    Patient's old records, notes and chart reviewed and summarized above.    Past Medical History:    Past Medical History:   Diagnosis Date    Anorexia     Diabetes mellitus (HCC)     GERD (gastroesophageal reflux disease)     Hypertension     Left foot drop 2008    Movement disorder     MVP (mitral valve prolapse)     Short-term memory loss     Stroke (HCC) 2000       Past Surgical History:    Past Surgical History:   Procedure Laterality Date    CATARACT EXTRACTION Right 2018    ORIF HUMERUS FRACTURE  2021    TOE AMPUTATION Left 08/2023     Previous  surgery:  seen notes from Urology in epic records    Medications:    Scheduled Meds:   hydrocortisone   Topical BID    atorvastatin  40 mg Oral Nightly    calcium carbonate  500 mg Oral TID    Vitamin D  2,000 Units Oral Daily    insulin glargine  19 Units SubCUTAneous QAM    pantoprazole  40 mg Oral QAM AC    verapamil  120 mg Oral Nightly    folic acid  1 mg Oral Daily    sodium chloride flush  5-40 mL IntraVENous 2 times per day    insulin lispro  0-8 Units SubCUTAneous 4x Daily AC & HS    insulin glargine  6 Units SubCUTAneous Nightly    apixaban  5 mg Oral BID    polyethylene glycol  17 g Oral Daily    tamsulosin  0.4 mg Oral Daily    pregabalin  150 mg Oral TID     Continuous Infusions:   sodium chloride      dextrose       PRN Meds:.sodium chloride flush, sodium chloride, ondansetron **OR** ondansetron, acetaminophen **OR** acetaminophen, glucose, dextrose bolus **OR** dextrose

## 2024-12-17 NOTE — PLAN OF CARE
Patient resting in bed on room air, alert and oriented x4, up to bathroom standby assist.  Patient complained of itchiness to left buttock and hip, [place] NP notified, hydrocortisone cream administered.   Patient complained of pain to buttock and hip, Tramadol administered, see MAR.  Patient is ACHS, did not require insulin coverage during shift.  Writer bladder scanned patient, 511 mL in bladder. Patient was able to void, 500 mL output.  Patient safety maintained.    Problem: Chronic Conditions and Co-morbidities  Goal: Patient's chronic conditions and co-morbidity symptoms are monitored and maintained or improved  12/16/2024 2105 by Aracelis Hoffman RN  Outcome: Progressing     Problem: Discharge Planning  Goal: Discharge to home or other facility with appropriate resources  12/16/2024 2105 by Aracelis Hoffman RN  Outcome: Progressing     Problem: Safety - Adult  Goal: Free from fall injury  12/16/2024 2105 by Aracelis Hoffman RN  Outcome: Progressing     Problem: ABCDS Injury Assessment  Goal: Absence of physical injury  12/16/2024 2105 by Aracelis Hoffman RN  Outcome: Progressing     Problem: Skin/Tissue Integrity - Adult  Goal: Skin integrity remains intact  12/16/2024 2105 by Aracelis Hoffman RN  Outcome: Progressing     Problem: Genitourinary - Adult  Goal: Absence of urinary retention  12/16/2024 2105 by Aracelis Hoffman RN  Outcome: Progressing     Problem: Genitourinary - Adult  Goal: Urinary catheter remains patent  12/16/2024 2105 by Aracelis Hoffman RN  Outcome: Progressing     Problem: Pain  Goal: Verbalizes/displays adequate comfort level or baseline comfort level  12/16/2024 2105 by Aracelis Hoffman RN  Outcome: Progressing  Patient having pain on their hip and buttock and rates it a 6. Pain interventions includemedication and regular rest periods. Patients goal for pain relief is 2. The need for pain and symptom management will be considered in the discharge planning process to ensure patients comfort.

## 2024-12-17 NOTE — DISCHARGE SUMMARY
[facility]  Office: [phone]  Srini Akhtar DO, Keenan Silva DO, Nii Figueroa DO, Rowdy Perdue DO, Vanessa Lombardi MD, Ssi Jang MD, Donna Esqueda MD, Flavia Dominguez MD,  Roberto Hart MD, Roddy Linn MD, Zuleima Kong MD,  Concepcion Dela Cruz DO, Ladarius Bradford MD, David Polo MD, Syed Akhtar DO, Tati Vargas MD,  Mikal Gómez DO, Milka Ramirez MD, Racheal Frazier MD, Miladys Godfrey MD, Brandan Bolanos MD,  Alexei Cuadra MD, Oscar Rincon MD, Lisandro Russell MD, Nesha Yuen MD, Ortega Benedict MD, Louie Bowles MD, Odilon Teran DO, Lon Zuñiga MD, Shirley Waterhouse, CNP,  Yarelis Isaac CNP, Odilon Latham, LEOBARDO,  Lise Lopez, ADELAIDA, Suzy Lazaro, CNP, Olesya Trejo, CNP, Luz Maria Vargas, CNP, Chrystal Vera, CNP, Shirlene Rizzo PA-C, Zoya Negro PA-C, Sujey Sevilla, LEOBARDO, Rod Malagon, CNP,  Yary Rod, CNP, Lizz Hoffman, CNP,  Paulina Bhatti, CNP, Dawna Scott, CNP         [facility]   IN-PATIENT SERVICE   [facility]    Discharge Summary     Patient ID: Camille Garduno  :  1955   MRN: [id number]     ACCOUNT:  [id number]   Patient's PCP: Dalia Burton APRN - CNP  Admit Date: 12/15/2024   Discharge Date: 2024     Length of Stay: 2  Code Status:  Full Code  Admitting Physician: No admitting provider for patient encounter.  Discharge Physician: Kendra Finley MD     Active Discharge Diagnoses:     Hospital Problem Lists:  Principal Problem:    CAM (acute kidney injury) (HCC)  Active Problems:    Hyperosmolar hyperglycemic state (HHS) (HCC)    Age related osteoporosis    Obsessive-compulsive disorder    Plantar fasciitis of right foot    Recurrent UTI    Retention of urine    Sensory ataxia    Urge incontinence    Type 2 diabetes mellitus with polyneuropathy (HCC)  Resolved Problems:    * No resolved hospital problems. *      Admission Condition:  fair     Discharged Condition: fair    Hospital Stay:

## 2024-12-17 NOTE — PLAN OF CARE
Patient is A&Ox4 is able to ambulate with standby assist with a walker. Patient is being discharged home with [facility]. Peripheral IV taken out, belongings gathered and sent with patient. Legal guardian, Cierra, called to inform of discharge, questions answered. Discharge instructions reviewed with patient, questions answered.   Problem: Chronic Conditions and Co-morbidities  Goal: Patient's chronic conditions and co-morbidity symptoms are monitored and maintained or improved  12/17/2024 1403 by Hailee Quach, RN  Outcome: Completed

## 2025-04-05 ENCOUNTER — HOSPITAL ENCOUNTER (EMERGENCY)
Age: 70
Discharge: HOME | End: 2025-04-05
Attending: EMERGENCY MEDICINE
Payer: COMMERCIAL

## 2025-04-05 VITALS
RESPIRATION RATE: 18 BRPM | HEART RATE: 83 BPM | OXYGEN SATURATION: 100 % | TEMPERATURE: 98.2 F | DIASTOLIC BLOOD PRESSURE: 83 MMHG | SYSTOLIC BLOOD PRESSURE: 157 MMHG

## 2025-04-05 DIAGNOSIS — Z76.0 ENCOUNTER FOR MEDICATION REFILL: Primary | ICD-10-CM

## 2025-04-05 DIAGNOSIS — G89.29 OTHER CHRONIC PAIN: ICD-10-CM

## 2025-04-05 PROCEDURE — 99283 EMERGENCY DEPT VISIT LOW MDM: CPT

## 2025-04-05 RX ORDER — TRAMADOL HYDROCHLORIDE 50 MG/1
50 TABLET ORAL EVERY 8 HOURS PRN
Qty: 9 TABLET | Refills: 0 | Status: SHIPPED | OUTPATIENT
Start: 2025-04-05 | End: 2025-04-08

## 2025-04-05 ASSESSMENT — PAIN SCALES - GENERAL: PAINLEVEL_OUTOF10: 8

## 2025-04-05 ASSESSMENT — PAIN - FUNCTIONAL ASSESSMENT: PAIN_FUNCTIONAL_ASSESSMENT: 0-10

## 2025-04-05 NOTE — ED PROVIDER NOTES
Team [facility] EMERGENCY DEPARTMENT  eMERGENCY dEPARTMENT eNCOUnter      Pt Name: Camille Garduno  MRN: [id number]  Birthdate 1955  Date of evaluation: 4/5/2025  Provider: Cielo Sauceda PA-C    CHIEF COMPLAINT       Chief Complaint   Patient presents with    Medication Refill     Pt states she needs her tramadol refilled.         HISTORY OF PRESENT ILLNESS  (Location/Symptom, Timing/Onset, Context/Setting, Quality, Duration, Modifying Factors, Severity.)   Camille Garduno is a 69 y.o. female who presents to the emergency department. Patient presents to ED for medication refill of Tramadol. Patient states she attempted to contact her PCP yesterday for a refill but states they were not in office, so they were unable to do the refill. Patient states she takes this medication regularly and will contact her PCP Monday for a refill but is requesting a refill to get her to Monday. Rates her pain a 7/10.     Nursing Notes were reviewed.    ALLERGIES     Patient has no known allergies.    CURRENT MEDICATIONS       Discharge Medication List as of 4/5/2025  2:57 PM        CONTINUE these medications which have NOT CHANGED    Details   tamsulosin (FLOMAX) 0.4 MG capsule Take 1 capsule by mouth daily, Disp-30 capsule, R-0Normal      ELIQUIS 5 MG TABS tablet Take 1 tablet by mouth 2 times daily, DAWHistorical Med      !! traMADol (ULTRAM) 50 MG tablet Take 1 tablet by mouth every 6 hours as needed for Pain.Historical Med      ondansetron (ZOFRAN-ODT) 4 MG disintegrating tablet Take 1 tablet by mouth 3 times daily as needed for Nausea or Vomiting, Disp-21 tablet, R-0Print      atorvastatin (LIPITOR) 40 MG tablet atorvastatin 40 mg tabletHistorical Med      calcium citrate (CALCITRATE) 950 (200 Ca) MG tablet Take 2 tablets by mouth 3 times dailyHistorical Med      Cholecalciferol (VITAMIN D3) 50 MCG (2000 UT) TABS take 2 tablets by mouth once dailyHistorical Med      folic acid (FOLVITE) 1 MG tablet Take 1 tablet by  that we have available that were pertinent to today's visit.      [place]AR[place] was reviewed. Medication discussed with patient. The patient was advised to not drink alcohol, drive, or operate heavy machinery while taking the medication. Evaluation and treatment course in the ED, and plan of care upon discharge was discussed in length with the patient. Patient had no further questions prior to being discharged and was instructed to return to the ED for new or worsening symptoms.              FINAL IMPRESSION      1. Encounter for medication refill    2. Other chronic pain            Problem List  Patient Active Problem List   Diagnosis Code    CAM (acute kidney injury) N17.9    Hyperosmolar hyperglycemic state (HHS) ([facility]) E11.00    Age related osteoporosis M81.0    Mixed hyperlipidemia E78.2    Neuropathy G62.9    Obsessive-compulsive disorder F42.9    Plantar fasciitis of right foot M72.2    Primary generalized (osteo)arthritis M15.0    Recurrent UTI N39.0    Retention of urine R33.9    Sensory ataxia R27.8    Urge incontinence N39.41    Type 2 diabetes mellitus with polyneuropathy ([facility]) E11.42         DISPOSITION/PLAN   DISPOSITION Decision To Discharge 04/05/2025 02:47:39 PM   DISPOSITION CONDITION Stable           PATIENT REFERRED TO:   Dalia Burton, APRN - CNP  [address] [facility] [address]  [phone]    Schedule an appointment as soon as possible for a visit       [facility] Emergency Department  [address] [address]  [phone]    As needed, If symptoms worsen      DISCHARGE MEDICATIONS:     Discharge Medication List as of 4/5/2025  2:57 PM        START taking these medications    Details   !! traMADol (ULTRAM) 50 MG tablet Take 1 tablet by mouth every 8 hours as needed for Pain for up to 3 days. Intended supply: 3 days. Take lowest dose possible to manage pain Max Daily Amount: 150 mg, Disp-9 tablet, R-0Normal       !! - Potential duplicate medications found. Please discuss

## 2025-04-15 ENCOUNTER — HOSPITAL ENCOUNTER (EMERGENCY)
Age: 70
Discharge: HOME | End: 2025-04-15
Attending: EMERGENCY MEDICINE
Payer: COMMERCIAL

## 2025-04-15 ENCOUNTER — APPOINTMENT (OUTPATIENT)
Dept: GENERAL RADIOLOGY | Age: 70
End: 2025-04-15
Payer: COMMERCIAL

## 2025-04-15 VITALS
DIASTOLIC BLOOD PRESSURE: 78 MMHG | RESPIRATION RATE: 17 BRPM | HEART RATE: 75 BPM | TEMPERATURE: 98.2 F | SYSTOLIC BLOOD PRESSURE: 152 MMHG | OXYGEN SATURATION: 96 %

## 2025-04-15 DIAGNOSIS — U07.1 COVID-19: Primary | ICD-10-CM

## 2025-04-15 LAB
ALBUMIN SERPL-MCNC: 3.6 G/DL (ref 3.5–5.2)
ALBUMIN/GLOB SERPL: 1.4 {RATIO} (ref 1–2.5)
ALP SERPL-CCNC: 57 U/L (ref 35–104)
ALT SERPL-CCNC: 20 U/L (ref 10–35)
ANION GAP SERPL CALCULATED.3IONS-SCNC: 10 MMOL/L (ref 9–16)
AST SERPL-CCNC: 21 U/L (ref 10–35)
BASOPHILS # BLD: 0.03 K/UL (ref 0–0.2)
BASOPHILS NFR BLD: 1 % (ref 0–2)
BILIRUB SERPL-MCNC: 0.3 MG/DL (ref 0–1.2)
BUN SERPL-MCNC: 15 MG/DL (ref 8–23)
CALCIUM SERPL-MCNC: 9 MG/DL (ref 8.8–10.2)
CHLORIDE SERPL-SCNC: 100 MMOL/L (ref 98–107)
CO2 SERPL-SCNC: 25 MMOL/L (ref 20–31)
CREAT SERPL-MCNC: 0.6 MG/DL (ref 0.5–0.9)
EOSINOPHIL # BLD: <0.03 K/UL (ref 0–0.44)
EOSINOPHILS RELATIVE PERCENT: 0 % (ref 1–4)
ERYTHROCYTE [DISTWIDTH] IN BLOOD BY AUTOMATED COUNT: 12.3 % (ref 11.8–14.4)
FLUAV RNA RESP QL NAA+PROBE: NOT DETECTED
FLUBV RNA RESP QL NAA+PROBE: NOT DETECTED
GFR, ESTIMATED: >90 ML/MIN/1.73M2
GLUCOSE SERPL-MCNC: 271 MG/DL (ref 82–115)
HCT VFR BLD AUTO: 37.9 % (ref 36.3–47.1)
HGB BLD-MCNC: 12.7 G/DL (ref 11.9–15.1)
IMM GRANULOCYTES # BLD AUTO: 0.01 K/UL (ref 0–0.3)
IMM GRANULOCYTES NFR BLD: 0 %
LIPASE SERPL-CCNC: 9 U/L (ref 13–60)
LYMPHOCYTES NFR BLD: 0.83 K/UL (ref 1.1–3.7)
LYMPHOCYTES RELATIVE PERCENT: 18 % (ref 24–43)
MAGNESIUM SERPL-MCNC: 1.7 MG/DL (ref 1.6–2.4)
MCH RBC QN AUTO: 30 PG (ref 25.2–33.5)
MCHC RBC AUTO-ENTMCNC: 33.5 G/DL (ref 28.4–34.8)
MCV RBC AUTO: 89.6 FL (ref 82.6–102.9)
MONOCYTES NFR BLD: 0.28 K/UL (ref 0.1–1.2)
MONOCYTES NFR BLD: 6 % (ref 3–12)
NEUTROPHILS NFR BLD: 75 % (ref 36–65)
NEUTS SEG NFR BLD: 3.59 K/UL (ref 1.5–8.1)
NRBC BLD-RTO: 0 PER 100 WBC
PLATELET # BLD AUTO: 214 K/UL (ref 138–453)
PMV BLD AUTO: 10.5 FL (ref 8.1–13.5)
POTASSIUM SERPL-SCNC: 4.4 MMOL/L (ref 3.7–5.3)
PROT SERPL-MCNC: 6.2 G/DL (ref 6.6–8.7)
RBC # BLD AUTO: 4.23 M/UL (ref 3.95–5.11)
SARS-COV-2 RNA RESP QL NAA+PROBE: DETECTED
SODIUM SERPL-SCNC: 135 MMOL/L (ref 136–145)
SOURCE: ABNORMAL
SPECIMEN DESCRIPTION: ABNORMAL
WBC OTHER # BLD: 4.8 K/UL (ref 3.5–11.3)

## 2025-04-15 PROCEDURE — 83735 ASSAY OF MAGNESIUM: CPT

## 2025-04-15 PROCEDURE — 71045 X-RAY EXAM CHEST 1 VIEW: CPT

## 2025-04-15 PROCEDURE — 87636 SARSCOV2 & INF A&B AMP PRB: CPT

## 2025-04-15 PROCEDURE — 99284 EMERGENCY DEPT VISIT MOD MDM: CPT

## 2025-04-15 PROCEDURE — 80053 COMPREHEN METABOLIC PANEL: CPT

## 2025-04-15 PROCEDURE — 83690 ASSAY OF LIPASE: CPT

## 2025-04-15 PROCEDURE — 85025 COMPLETE CBC W/AUTO DIFF WBC: CPT

## 2025-04-15 RX ORDER — ONDANSETRON 4 MG/1
4 TABLET, ORALLY DISINTEGRATING ORAL 3 TIMES DAILY PRN
Qty: 21 TABLET | Refills: 0 | Status: SHIPPED | OUTPATIENT
Start: 2025-04-15

## 2025-04-15 ASSESSMENT — ENCOUNTER SYMPTOMS
SHORTNESS OF BREATH: 0
EYE REDNESS: 0
EYE PAIN: 0
VOMITING: 1
SORE THROAT: 1
DIARRHEA: 0
BACK PAIN: 0
COUGH: 1
ABDOMINAL PAIN: 0

## 2025-04-15 ASSESSMENT — PAIN - FUNCTIONAL ASSESSMENT: PAIN_FUNCTIONAL_ASSESSMENT: 0-10

## 2025-04-15 ASSESSMENT — PAIN SCALES - GENERAL: PAINLEVEL_OUTOF10: 4

## 2025-04-15 NOTE — ED PROVIDER NOTES
[facility] EMERGENCY DEPARTMENT  EMERGENCY MEDICINE     Pt Name: Camille Garduno  MRN: [id number]  Birthdate 1955  Date of evaluation: 4/15/2025  PCP:    Dalia Burton APRN - CNP  Provider: Syed Villa DO    CHIEF COMPLAINT       Chief Complaint   Patient presents with    Abdominal Pain     Lower abdominal    Urinary Frequency     Possible UTI    Shortness of Breath     Worse the last week    Dizziness     Last 3 days not sure if it's d/t lack of appetite    Vomiting       HISTORY OF PRESENT ILLNESS    Patient is 69-year-old female presents secondary to sore throat, vomiting, cough.  Patient sent in by nursing home for having persistent vomiting.  She had COVID testing urine done yesterday results are not back yet.  Patient feels like maybe she could have a UTI.  Denies any significant abdominal pain at this time.  No chest pain or shortness of breath.          Triage notes and Nursing notes were reviewed by myself.  Any discrepancies are addressed above.    PAST MEDICAL HISTORY     Past Medical History:   Diagnosis Date    Anorexia     Diabetes mellitus (HCC)     GERD (gastroesophageal reflux disease)     Hypertension     Left foot drop 2008    Movement disorder     MVP (mitral valve prolapse)     Short-term memory loss     Stroke (HCC) 2000       SURGICAL HISTORY       Past Surgical History:   Procedure Laterality Date    CATARACT EXTRACTION Right 2018    ORIF HUMERUS FRACTURE  2021    TOE AMPUTATION Left 08/2023       CURRENT MEDICATIONS       Previous Medications    ATORVASTATIN (LIPITOR) 40 MG TABLET    atorvastatin 40 mg tablet    CALCIUM CITRATE (CALCITRATE) 950 (200 CA) MG TABLET    Take 2 tablets by mouth 3 times daily    CHOLECALCIFEROL (VITAMIN D3) 50 MCG (2000 UT) TABS    take 2 tablets by mouth once daily    ELIQUIS 5 MG TABS TABLET    Take 1 tablet by mouth 2 times daily    FOLIC ACID (FOLVITE) 1 MG TABLET    Take 1 tablet by mouth daily    INSULIN GLARGINE (LANTUS SOLOSTAR) 100

## 2025-04-15 NOTE — ED NOTES
This writer spoke with patient sister listed in contact list in chart \"ari\" josefina frey, patients medical and durable power of  who reported that patient was exposed to RSV, covid at [facility] where patient resides, pt has hx of diabetes,  anorexia, and alcohol abuse

## 2025-04-15 NOTE — ED TRIAGE NOTES
Pt arrived via squad from [facility] d/t flu like symptoms, the facility has had multiple residents with COVID/FLU/RSV. Pt had an episode of emesis before squad arrived, pt given 4 mg of Zofran by squad. Pt c/o dizziness not sure if it's d/t lack of appetite and pt is type 1 diabetic. Pt c/o coughing, sore throat, increased SOB, possible UTI and lower abdominal pain. Pt was tested for COVID and UTI in facility but awaiting results.